# Patient Record
Sex: MALE | Race: WHITE | NOT HISPANIC OR LATINO | Employment: FULL TIME | URBAN - METROPOLITAN AREA
[De-identification: names, ages, dates, MRNs, and addresses within clinical notes are randomized per-mention and may not be internally consistent; named-entity substitution may affect disease eponyms.]

---

## 2017-01-03 ENCOUNTER — TELEPHONE (OUTPATIENT)
Dept: MEDICAL GROUP | Facility: PHYSICIAN GROUP | Age: 42
End: 2017-01-03

## 2017-01-03 DIAGNOSIS — I10 ESSENTIAL HYPERTENSION: Chronic | ICD-10-CM

## 2017-01-03 RX ORDER — LOSARTAN POTASSIUM AND HYDROCHLOROTHIAZIDE 12.5; 1 MG/1; MG/1
1 TABLET ORAL DAILY
Qty: 90 TAB | Refills: 0 | Status: SHIPPED | OUTPATIENT
Start: 2017-01-03 | End: 2017-08-02 | Stop reason: SDUPTHER

## 2017-03-01 ENCOUNTER — OFFICE VISIT (OUTPATIENT)
Dept: MEDICAL GROUP | Facility: PHYSICIAN GROUP | Age: 42
End: 2017-03-01
Payer: COMMERCIAL

## 2017-03-01 VITALS
BODY MASS INDEX: 30.63 KG/M2 | OXYGEN SATURATION: 98 % | TEMPERATURE: 98.6 F | WEIGHT: 218.8 LBS | HEART RATE: 90 BPM | RESPIRATION RATE: 16 BRPM | HEIGHT: 71 IN | SYSTOLIC BLOOD PRESSURE: 118 MMHG | DIASTOLIC BLOOD PRESSURE: 88 MMHG

## 2017-03-01 DIAGNOSIS — M75.51 BURSITIS OF RIGHT SHOULDER: ICD-10-CM

## 2017-03-01 DIAGNOSIS — F51.01 PRIMARY INSOMNIA: ICD-10-CM

## 2017-03-01 DIAGNOSIS — M25.511 CHRONIC RIGHT SHOULDER PAIN: ICD-10-CM

## 2017-03-01 DIAGNOSIS — G89.29 CHRONIC RIGHT SHOULDER PAIN: ICD-10-CM

## 2017-03-01 DIAGNOSIS — I10 ESSENTIAL HYPERTENSION: ICD-10-CM

## 2017-03-01 DIAGNOSIS — E66.9 OBESITY (BMI 30-39.9): ICD-10-CM

## 2017-03-01 PROCEDURE — 99214 OFFICE O/P EST MOD 30 MIN: CPT | Performed by: NURSE PRACTITIONER

## 2017-03-01 RX ORDER — ZOLPIDEM TARTRATE 5 MG/1
5 TABLET ORAL NIGHTLY PRN
Qty: 30 TAB | Refills: 0 | Status: SHIPPED | OUTPATIENT
Start: 2017-03-01 | End: 2017-06-07

## 2017-03-01 ASSESSMENT — PATIENT HEALTH QUESTIONNAIRE - PHQ9: CLINICAL INTERPRETATION OF PHQ2 SCORE: 0

## 2017-03-01 NOTE — PROGRESS NOTES
Subjective:     Chief Complaint   Patient presents with   • Medication Management     Losartan    • Sleep Problem       HPI:  Leo Manuel is a 41 y.o. male here today to discuss the following:    Right shoulder pain  Patient had surgery on the right shoulder November 2016. Problem is resolved and patient is in recovery.    Hypertension  Chronic condition: Patient has been treated for hypertension for several years. He is currently taking losartan-hydrochlorothiazide without any side effects and blood pressure is well controlled. He denies any chest pain, diaphoresis, shortness of breath, headaches, dizziness or blurred vision.     Primary insomnia  Reports difficulty staying asleep. He is able to fall asleep without assistance. Goes to bed  and wakes up 2 hours later without alarm. He admits to feeling tired and nonrefreshed in morning daytime fatigue.             Current medicines (including changes today)  Current Outpatient Prescriptions   Medication Sig Dispense Refill   • zolpidem (AMBIEN) 5 MG Tab Take 1 Tab by mouth at bedtime as needed for Sleep. 30 Tab 0   • losartan-hydrochlorothiazide (HYZAAR) 100-12.5 MG per tablet Take 1 Tab by mouth every day. 90 Tab 0   • Calcium Carbonate Antacid (TUMS PO) Take  by mouth as needed.     • ibuprofen (MOTRIN) 200 MG Tab Take 200 mg by mouth every 6 hours as needed.     • naproxen (ALEVE) 220 MG tablet Take 220 mg by mouth 2 times a day, with meals.     • tizanidine (ZANAFLEX) 4 MG Tab Take 4 mg by mouth at bedtime as needed.     • oxycodone immediate release (ROXICODONE) 10 MG immediate release tablet Take 10 mg by mouth every four hours as needed for Moderate Pain.       No current facility-administered medications for this visit.       He  has a past medical history of Hypertension; Heart burn; Pain; and Elevated cholesterol.    ROS   Review of Systems   Constitutional: Negative for fever, chills, weight loss and malaise/fatigue.   HENT: Negative for ear pain,  "nosebleeds, congestion, sore throat and neck pain.    Respiratory: Negative for cough, sputum production, shortness of breath and wheezing.    Cardiovascular: Negative for chest pain, palpitations,  and leg swelling.   Gastrointestinal: Negative for heartburn, nausea, vomiting, diarrhea and abdominal pain.   Neurological: Negative for dizziness, tingling, tremors, sensory change, focal weakness and headaches.   Psychiatric/Behavioral: Negative for depression, anxiety, suicidal ideas,  and memory loss.  Positive for insomnia  All other systems reviewed and are negative except as in HPI.     Objective:   Physical Exam:  Blood pressure 118/88, pulse 90, temperature 37 °C (98.6 °F), resp. rate 16, height 1.803 m (5' 10.98\"), weight 99.247 kg (218 lb 12.8 oz), SpO2 98 %. Body mass index is 30.53 kg/(m^2).   General:  Well nourished, well developed in NAD  Head is grossly normal.  Neck: Supple without JVD   Pulmonary:  Normal effort.  Cardiovascular: Regular rate   Extremities: no clubbing, cyanosis, or edema.   Assessment and Plan:   The following treatment plan was discussed   1. Primary insomnia  zolpidem (AMBIEN) 5 MG Tab   2. Chronic right shoulder pain     3. Bursitis of right shoulder     4. Essential hypertension     5. Obesity (BMI 30-39.9)  Patient identified as having weight management issue.  Appropriate orders and counseling given.   Discussed sleep hygiene. Go to bed and wake up at consistent times whether work/school day or not. Keep room dark, quiet, and comfortable. Don't nap. Increase exposure to sunlight during awake times and avoid bright lights before going to sleep. Avoid stimulant or caffeine use more than 4 hours after wake time.     Followup: Return in about 1 year (around 3/1/2018), or if symptoms worsen or fail to improve, for HTN/Lipid, insomnia.   Please note that this dictation was created using voice recognition software. I have made every reasonable attempt to correct obvious errors, but I " expect that there are errors of grammar and possibly content that I did not discover before finalizing the note.

## 2017-03-01 NOTE — PATIENT INSTRUCTIONS
Zolpidem tablets  What is this medicine?  ZOLPIDEM (zole PI dem) is used to treat insomnia. This medicine helps you to fall asleep and sleep through the night.  This medicine may be used for other purposes; ask your health care provider or pharmacist if you have questions.  COMMON BRAND NAME(S): Eliel  What should I tell my health care provider before I take this medicine?  They need to know if you have any of these conditions:  -depression  -history of a drug or alcohol abuse problem  -liver disease  -lung or breathing disease  -suicidal thoughts  -an unusual or allergic reaction to zolpidem, other medicines, foods, dyes, or preservatives  -pregnant or trying to get pregnant  -breast-feeding  How should I use this medicine?  Take this medicine by mouth with a glass of water. Follow the directions on the prescription label. It is better to take this medicine on an empty stomach and only when you are ready for bed. Do not take your medicine more often than directed. If you have been taking this medicine for several weeks and suddenly stop taking it, you may get unpleasant withdrawal symptoms. Your doctor or health care professional may want to gradually reduce the dose. Do not stop taking this medicine on your own. Always follow your doctor or health care professional's advice.  A special MedGuide will be given to you by the pharmacist with each prescription and refill. Be sure to read this information carefully each time.  Talk to your pediatrician regarding the use of this medicine in children. Special care may be needed.  Overdosage: If you think you have taken too much of this medicine contact a poison control center or emergency room at once.  NOTE: This medicine is only for you. Do not share this medicine with others.  What if I miss a dose?  This does not apply. This medicine should only be taken immediately before going to sleep. Do not take double or extra doses.  What may interact with this  medicine?  -herbal medicines like kava kava, melatonin, Lee's wort and valerian  -medicines for fungal infections like ketoconazole, fluconazole, or itraconazole  -medicines for treating depression or other mental problems  -other medicines given for sleep  -some medicines for Parkinson' s disease or other movement disorders  -some medicines used to treat HIV infection or AIDS, like ritonavir  This list may not describe all possible interactions. Give your health care provider a list of all the medicines, herbs, non-prescription drugs, or dietary supplements you use. Also tell them if you smoke, drink alcohol, or use illegal drugs. Some items may interact with your medicine.  What should I watch for while using this medicine?  Visit your doctor or health care professional for regular checks on your progress. Keep a regular sleep schedule by going to bed at about the same time each night. Avoid caffeine-containing drinks in the evening hours. When sleep medicines are used every night for more than a few weeks, they may stop working. Talk to your doctor if you still have trouble sleeping.  Do not take this medicine unless you are able to get a full night's sleep before you must be active again. You may not be able to remember things that you do in the hours after you take this medicine. Some people have reported driving, making phone calls, or preparing and eating food while asleep after taking sleep medicine. Take this medicine right before going to sleep. Tell your doctor if you are have any problems with your memory.  After you stop taking this medicine, you may have trouble falling asleep. This is called rebound insomnia. This problem usually goes away on its own after 1 or 2 nights.  You may get drowsy or dizzy. Do not drive, use machinery, or do anything that needs mental alertness until you know how this medicine affects you. Do not stand or sit up quickly, especially if you are an older patient. This  reduces the risk of dizzy or fainting spells. Alcohol may interfere with the effect of this medicine. Avoid alcoholic drinks. This medicine may cause a decrease in mental alertness the morning after use, even if you feel that you are fully awake. Tell your doctor if you will need to perform activities requiring full alertness, such as driving, the next morning after you have taken this medicine.  If you or your family notice any changes in your behavior, or if you have any unusual or disturbing thoughts, call your doctor right away.  What side effects may I notice from receiving this medicine?  Side effects that you should report to your doctor or health care professional as soon as possible:  -allergic reactions like skin rash, itching or hives, swelling of the face, lips, or tongue  -changes in vision  -confusion  -depressed mood  -feeling faint or lightheaded, falls  -hallucinations  -problems with balance, speaking, walking  -restlessness, excitability, or feelings of agitation  -unusual activities while asleep like driving, eating, making phone calls  Side effects that usually do not require medical attention (report to your doctor or health care professional if they continue or are bothersome):  -diarrhea  -dizziness, or daytime drowsiness, sometimes called a hangover effect  -headache  This list may not describe all possible side effects. Call your doctor for medical advice about side effects. You may report side effects to FDA at 7-518-FDA-3390.  Where should I keep my medicine?  Keep out of the reach of children. This medicine can be abused. Keep your medicine in a safe place to protect it from theft. Do not share this medicine with anyone. Selling or giving away this medicine is dangerous and against the law.  Store at room temperature between 20 and 25 degrees C (68 and 77 degrees F). Throw away any unused medicine after the expiration date.  NOTE: This sheet is a summary. It may not cover all possible  information. If you have questions about this medicine, talk to your doctor, pharmacist, or health care provider.  © 2014, [employer]. (12/3/2013 4:54:48 PM)

## 2017-03-01 NOTE — MR AVS SNAPSHOT
"        Leo Manuel   3/1/2017 8:20 AM   Office Visit   MRN: [id number]    Department:  [facility]   Dept Phone:  [phone]    Description:  Male : 1975   Provider:  YARITZA Liriano           Reason for Visit     Medication Management Losartan     Sleep Problem           Allergies as of 3/1/2017     Allergen Noted Reactions    Shellfish Allergy 2016   Anaphylaxis      You were diagnosed with     Primary insomnia   [677563]       Chronic right shoulder pain   [749785]       Bursitis of right shoulder   [555338]       Essential hypertension   [5724106]       Obesity (BMI 30-39.9)   [688614]         Vital Signs     Blood Pressure Pulse Temperature Respirations Height Weight    118/88 mmHg 90 37 °C (98.6 °F) 16 1.803 m (5' 10.98\") 99.247 kg (218 lb 12.8 oz)    Body Mass Index Oxygen Saturation Smoking Status             30.53 kg/m2 98% Current Every Day Smoker         Basic Information     Date Of Birth Sex Race Ethnicity Preferred Language    1975 Male White Non- English      Problem List              ICD-10-CM Priority Class Noted - Resolved    Hypertension I10   2016 - Present    Bursitis of right shoulder M75.51   2016 - Present    Right shoulder pain M25.511   2016 - Present    Primary insomnia F51.01   3/1/2017 - Present    Obesity (BMI 30-39.9) E66.9   3/1/2017 - Present      Health Maintenance        Date Due Completion Dates    IMM DTaP/Tdap/Td Vaccine (1 - Tdap) 1994 ---    IMM INFLUENZA (1) 2016 ---            Current Immunizations     No immunizations on file.      Below and/or attached are the medications your provider expects you to take. Review all of your home medications and newly ordered medications with your provider and/or pharmacist. Follow medication instructions as directed by your provider and/or pharmacist. Please keep your medication list with you and share with your provider. Update the information when " medications are discontinued, doses are changed, or new medications (including over-the-counter products) are added; and carry medication information at all times in the event of emergency situations     Allergies:  SHELLFISH ALLERGY - Anaphylaxis               Medications  Valid as of: March 01, 2017 - 12:37 PM    Generic Name Brand Name Tablet Size Instructions for use    Calcium Carbonate Antacid   Take  by mouth as needed.        Ibuprofen (Tab) MOTRIN 200 MG Take 200 mg by mouth every 6 hours as needed.        Losartan Potassium-HCTZ (Tab) HYZAAR 100-12.5 MG Take 1 Tab by mouth every day.        Naproxen Sodium (Tab) ANAPROX 220 MG Take 220 mg by mouth 2 times a day, with meals.        OxyCODONE HCl (Tab) ROXICODONE 10 MG Take 10 mg by mouth every four hours as needed for Moderate Pain.        TiZANidine HCl (Tab) ZANAFLEX 4 MG Take 4 mg by mouth at bedtime as needed.        Zolpidem Tartrate (Tab) AMBIEN 5 MG Take 1 Tab by mouth at bedtime as needed for Sleep.        .                 Medicines prescribed today were sent to:     [facility] [address] [place], [place] - [facility] [address] [address]    Phone: [phone] Fax: [phone]    Open 24 Hours?: No      Medication refill instructions:       If your prescription bottle indicates you have medication refills left, it is not necessary to call your provider’s office. Please contact your pharmacy and they will refill your medication.    If your prescription bottle indicates you do not have any refills left, you may request refills at any time through one of the following ways: The online [employer] system (except Urgent Care), by calling your provider’s office, or by asking your pharmacy to contact your provider’s office with a refill request. Medication refills are processed only during regular business hours and may not be available until the next business day. Your provider may request additional information or to have a  follow-up visit with you prior to refilling your medication.   *Please Note: Medication refills are assigned a new Rx number when refilled electronically. Your pharmacy may indicate that no refills were authorized even though a new prescription for the same medication is available at the pharmacy. Please request the medicine by name with the pharmacy before contacting your provider for a refill.        Instructions    Zolpidem tablets  What is this medicine?  ZOLPIDEM (zole PI dem) is used to treat insomnia. This medicine helps you to fall asleep and sleep through the night.  This medicine may be used for other purposes; ask your health care provider or pharmacist if you have questions.  COMMON BRAND NAME(S): Eliel  What should I tell my health care provider before I take this medicine?  They need to know if you have any of these conditions:  -depression  -history of a drug or alcohol abuse problem  -liver disease  -lung or breathing disease  -suicidal thoughts  -an unusual or allergic reaction to zolpidem, other medicines, foods, dyes, or preservatives  -pregnant or trying to get pregnant  -breast-feeding  How should I use this medicine?  Take this medicine by mouth with a glass of water. Follow the directions on the prescription label. It is better to take this medicine on an empty stomach and only when you are ready for bed. Do not take your medicine more often than directed. If you have been taking this medicine for several weeks and suddenly stop taking it, you may get unpleasant withdrawal symptoms. Your doctor or health care professional may want to gradually reduce the dose. Do not stop taking this medicine on your own. Always follow your doctor or health care professional's advice.  A special MedGuide will be given to you by the pharmacist with each prescription and refill. Be sure to read this information carefully each time.  Talk to your pediatrician regarding the use of this medicine in children.  Special care may be needed.  Overdosage: If you think you have taken too much of this medicine contact a poison control center or emergency room at once.  NOTE: This medicine is only for you. Do not share this medicine with others.  What if I miss a dose?  This does not apply. This medicine should only be taken immediately before going to sleep. Do not take double or extra doses.  What may interact with this medicine?  -herbal medicines like kava kava, melatonin, Gaastra's wort and valerian  -medicines for fungal infections like ketoconazole, fluconazole, or itraconazole  -medicines for treating depression or other mental problems  -other medicines given for sleep  -some medicines for Parkinson' s disease or other movement disorders  -some medicines used to treat HIV infection or AIDS, like ritonavir  This list may not describe all possible interactions. Give your health care provider a list of all the medicines, herbs, non-prescription drugs, or dietary supplements you use. Also tell them if you smoke, drink alcohol, or use illegal drugs. Some items may interact with your medicine.  What should I watch for while using this medicine?  Visit your doctor or health care professional for regular checks on your progress. Keep a regular sleep schedule by going to bed at about the same time each night. Avoid caffeine-containing drinks in the evening hours. When sleep medicines are used every night for more than a few weeks, they may stop working. Talk to your doctor if you still have trouble sleeping.  Do not take this medicine unless you are able to get a full night's sleep before you must be active again. You may not be able to remember things that you do in the hours after you take this medicine. Some people have reported driving, making phone calls, or preparing and eating food while asleep after taking sleep medicine. Take this medicine right before going to sleep. Tell your doctor if you are have any problems with  your memory.  After you stop taking this medicine, you may have trouble falling asleep. This is called rebound insomnia. This problem usually goes away on its own after 1 or 2 nights.  You may get drowsy or dizzy. Do not drive, use machinery, or do anything that needs mental alertness until you know how this medicine affects you. Do not stand or sit up quickly, especially if you are an older patient. This reduces the risk of dizzy or fainting spells. Alcohol may interfere with the effect of this medicine. Avoid alcoholic drinks. This medicine may cause a decrease in mental alertness the morning after use, even if you feel that you are fully awake. Tell your doctor if you will need to perform activities requiring full alertness, such as driving, the next morning after you have taken this medicine.  If you or your family notice any changes in your behavior, or if you have any unusual or disturbing thoughts, call your doctor right away.  What side effects may I notice from receiving this medicine?  Side effects that you should report to your doctor or health care professional as soon as possible:  -allergic reactions like skin rash, itching or hives, swelling of the face, lips, or tongue  -changes in vision  -confusion  -depressed mood  -feeling faint or lightheaded, falls  -hallucinations  -problems with balance, speaking, walking  -restlessness, excitability, or feelings of agitation  -unusual activities while asleep like driving, eating, making phone calls  Side effects that usually do not require medical attention (report to your doctor or health care professional if they continue or are bothersome):  -diarrhea  -dizziness, or daytime drowsiness, sometimes called a hangover effect  -headache  This list may not describe all possible side effects. Call your doctor for medical advice about side effects. You may report side effects to FDA at 0-201-FDA-3426.  Where should I keep my medicine?  Keep out of the reach of  children. This medicine can be abused. Keep your medicine in a safe place to protect it from theft. Do not share this medicine with anyone. Selling or giving away this medicine is dangerous and against the law.  Store at room temperature between 20 and 25 degrees C (68 and 77 degrees F). Throw away any unused medicine after the expiration date.  NOTE: This sheet is a summary. It may not cover all possible information. If you have questions about this medicine, talk to your doctor, pharmacist, or health care provider.  © 2014, [employer]. (12/3/2013 4:54:48 PM)            MyChart Status: Patient Declined

## 2017-06-07 ENCOUNTER — OFFICE VISIT (OUTPATIENT)
Dept: MEDICAL GROUP | Facility: PHYSICIAN GROUP | Age: 42
End: 2017-06-07
Payer: COMMERCIAL

## 2017-06-07 VITALS
RESPIRATION RATE: 16 BRPM | HEIGHT: 71 IN | WEIGHT: 198.8 LBS | DIASTOLIC BLOOD PRESSURE: 100 MMHG | SYSTOLIC BLOOD PRESSURE: 128 MMHG | OXYGEN SATURATION: 96 % | BODY MASS INDEX: 27.83 KG/M2 | TEMPERATURE: 97.7 F | HEART RATE: 93 BPM

## 2017-06-07 DIAGNOSIS — Z91.09 ENVIRONMENTAL ALLERGIES: ICD-10-CM

## 2017-06-07 DIAGNOSIS — G89.29 CHRONIC RIGHT SHOULDER PAIN: ICD-10-CM

## 2017-06-07 DIAGNOSIS — M54.40 ACUTE RIGHT-SIDED LOW BACK PAIN WITH SCIATICA, SCIATICA LATERALITY UNSPECIFIED: ICD-10-CM

## 2017-06-07 DIAGNOSIS — M25.511 CHRONIC RIGHT SHOULDER PAIN: ICD-10-CM

## 2017-06-07 DIAGNOSIS — N52.2 DRUG-INDUCED ERECTILE DYSFUNCTION: ICD-10-CM

## 2017-06-07 PROBLEM — N52.9 ED (ERECTILE DYSFUNCTION): Status: ACTIVE | Noted: 2017-06-07

## 2017-06-07 PROBLEM — M54.41 RIGHT-SIDED LOW BACK PAIN WITH SCIATICA: Status: ACTIVE | Noted: 2017-06-07

## 2017-06-07 PROCEDURE — 99214 OFFICE O/P EST MOD 30 MIN: CPT | Mod: 25 | Performed by: NURSE PRACTITIONER

## 2017-06-07 PROCEDURE — 96372 THER/PROPH/DIAG INJ SC/IM: CPT | Performed by: NURSE PRACTITIONER

## 2017-06-07 RX ORDER — TADALAFIL 10 MG/1
10 TABLET ORAL PRN
Qty: 60 TAB | Refills: 0 | Status: SHIPPED
Start: 2017-06-07 | End: 2017-10-18 | Stop reason: SDUPTHER

## 2017-06-07 RX ORDER — TRIAMCINOLONE ACETONIDE 40 MG/ML
40 INJECTION, SUSPENSION INTRA-ARTICULAR; INTRAMUSCULAR ONCE
Status: COMPLETED | OUTPATIENT
Start: 2017-06-07 | End: 2017-06-07

## 2017-06-07 RX ADMIN — TRIAMCINOLONE ACETONIDE 40 MG: 40 INJECTION, SUSPENSION INTRA-ARTICULAR; INTRAMUSCULAR at 17:03

## 2017-06-07 NOTE — MR AVS SNAPSHOT
"        Leo Manuel   2017 4:20 PM   Office Visit   MRN: [id number]    Department:  [facility]   Dept Phone:  [phone]    Description:  Male : 1975   Provider:  YARITZA Liriano           Reason for Visit     Injections Kenalog    Nevus     Medication Management           Allergies as of 2017     Allergen Noted Reactions    Shellfish Allergy 2016   Anaphylaxis      You were diagnosed with     Environmental allergies   [159529]   Kenalog 40 mg injection, continue current medication    Chronic right shoulder pain   [238301]   Follow-up with [facility]    Acute right-sided low back pain with sciatica, sciatica laterality unspecified   [1529593]   Follow-up with [facility]    Drug-induced erectile dysfunction   [[id number]]   Cialis as directed      Vital Signs     Blood Pressure Pulse Temperature Respirations Height Weight    128/100 mmHg 93 36.5 °C (97.7 °F) 16 1.803 m (5' 10.98\") 90.175 kg (198 lb 12.8 oz)    Body Mass Index Oxygen Saturation Smoking Status             27.74 kg/m2 96% Current Every Day Smoker         Basic Information     Date Of Birth Sex Race Ethnicity Preferred Language    1975 Male White Non- English      Problem List              ICD-10-CM Priority Class Noted - Resolved    Hypertension I10   2016 - Present    Bursitis of right shoulder M75.51   2016 - Present    Right shoulder pain M25.511   2016 - Present    Primary insomnia F51.01   3/1/2017 - Present    Obesity (BMI 30-39.9) E66.9   3/1/2017 - Present    Environmental allergies Z91.09   2017 - Present    Right-sided low back pain with sciatica M54.41   2017 - Present    ED (erectile dysfunction) N52.9   2017 - Present      Health Maintenance        Date Due Completion Dates    IMM DTaP/Tdap/Td Vaccine (1 - Tdap) 1994 ---            Current Immunizations     No immunizations on file.      Below and/or attached are " the medications your provider expects you to take. Review all of your home medications and newly ordered medications with your provider and/or pharmacist. Follow medication instructions as directed by your provider and/or pharmacist. Please keep your medication list with you and share with your provider. Update the information when medications are discontinued, doses are changed, or new medications (including over-the-counter products) are added; and carry medication information at all times in the event of emergency situations     Allergies:  SHELLFISH ALLERGY - Anaphylaxis               Medications  Valid as of: June 07, 2017 -  6:46 PM    Generic Name Brand Name Tablet Size Instructions for use    Calcium Carbonate Antacid   Take  by mouth as needed.        Ibuprofen (Tab) MOTRIN 200 MG Take 200 mg by mouth every 6 hours as needed.        Losartan Potassium-HCTZ (Tab) HYZAAR 100-12.5 MG Take 1 Tab by mouth every day.        Naproxen Sodium (Tab) ANAPROX 220 MG Take 220 mg by mouth 2 times a day, with meals.        OxyCODONE HCl (Tab) ROXICODONE 10 MG Take 10 mg by mouth every four hours as needed for Moderate Pain.        Tadalafil (Tab) CIALIS 10 MG Take 1 Tab by mouth as needed for Erectile Dysfunction.        TiZANidine HCl (Tab) ZANAFLEX 4 MG Take 4 mg by mouth at bedtime as needed.        .                 Medicines prescribed today were sent to:     [facility], [place] - [address] [id number] [facility] [address]    Phone: [phone] Fax: [phone]    Open 24 Hours?: No      Medication refill instructions:       If your prescription bottle indicates you have medication refills left, it is not necessary to call your provider’s office. Please contact your pharmacy and they will refill your medication.    If your prescription bottle indicates you do not have any refills left, you may request refills at any time through one of the following ways: The online GitHub system  (except Urgent Care), by calling your provider’s office, or by asking your pharmacy to contact your provider’s office with a refill request. Medication refills are processed only during regular business hours and may not be available until the next business day. Your provider may request additional information or to have a follow-up visit with you prior to refilling your medication.   *Please Note: Medication refills are assigned a new Rx number when refilled electronically. Your pharmacy may indicate that no refills were authorized even though a new prescription for the same medication is available at the pharmacy. Please request the medicine by name with the pharmacy before contacting your provider for a refill.           Pongr Access Code: [email or web address]  Expires: 7/7/2017  4:40 PM    Pongr  A secure, online tool to manage your health information     [employer]’s Pongr® is a secure, online tool that connects you to your personalized health information from the privacy of your home -- day or night - making it very easy for you to manage your healthcare. Once the activation process is completed, you can even access your medical information using the Pongr saira, which is available for free in the Apple Saira store or Google Play store.     Pongr provides the following levels of access (as shown below):   My Chart Features   [facility] Doctor [facility]  Specialists [facility]  Urgent  Care Non-[facility]  Primary Care  Doctor   Email your healthcare team securely and privately 24/7 X X X    Manage appointments: schedule your next appointment; view details of past/upcoming appointments X      Request prescription refills. X      View recent personal medical records, including lab and immunizations X X X X   View health record, including health history, allergies, medications X X X X   Read reports about your outpatient visits, procedures, consult and ER notes X X X X   See your discharge summary, which is a  recap of your hospital and/or ER visit that includes your diagnosis, lab results, and care plan. X X       How to register for TopRealty:  1. Go to  [email or web address].  2. Click on the Sign Up Now box, which takes you to the New Member Sign Up page. You will need to provide the following information:  a. Enter your TopRealty Access Code exactly as it appears at the top of this page. (You will not need to use this code after you’ve completed the sign-up process. If you do not sign up before the expiration date, you must request a new code.)   b. Enter your date of birth.   c. Enter your home email address.   d. Click Submit, and follow the next screen’s instructions.  3. Create a TopRealty ID. This will be your TopRealty login ID and cannot be changed, so think of one that is secure and easy to remember.  4. Create a TopRealty password. You can change your password at any time.  5. Enter your Password Reset Question and Answer. This can be used at a later time if you forget your password.   6. Enter your e-mail address. This allows you to receive e-mail notifications when new information is available in TopRealty.  7. Click Sign Up. You can now view your health information.    For assistance activating your TopRealty account, call [phone]        Quit Tobacco Information     Do you want to quit using tobacco?    Quitting tobacco decreases risks of cancer, heart and lung disease, increases life expectancy, improves sense of taste and smell, and increases spending money, among other benefits.    If you are thinking about quitting, we can help.  • [place] Quit Tobacco Program: 460.931.2061  o Program occurs weekly for four weeks and includes pharmacist consultation on products to support quitting smoking or chewing tobacco. A provider referral is needed for pharmacist consultation.  • Tobacco Users Help Hotline: 9-800-QUIT-NOW (918-4758) or https://[place].quitlogix.org/  o Free, confidential telephone and online  coaching for [place] residents. Sessions are designed on a schedule that is convenient for you. Eligible clients receive free nicotine replacement therapy.  • Nationally: www.smokefree.gov  o Information and professional assistance to support both immediate and long-term needs as you become, and remain, a non-smoker. Smokefree.gov allows you to choose the help that best fits your needs.

## 2017-06-07 NOTE — ASSESSMENT & PLAN NOTE
Patient is experiencing ED intermittently for the past two years. He has been taking Cialis which he gets from a [facility] pharmacy and is requesting a refill. Advised to monitor BP prior to using medication. Patient states that when he is not taking pain medication he is able to get an erection.

## 2017-06-07 NOTE — ASSESSMENT & PLAN NOTE
Patient previously had right shoulder arthroscopic surgery and continues to have pain. He has been followed up by his surgeon and received cortisone injection, and continues to have pain. He has always had full range of motion.

## 2017-06-07 NOTE — ASSESSMENT & PLAN NOTE
Patient has environmental allergies for many years. He's currently taking OTC antihistamine and Flonase nasal spray without any relief. Has nasal congestion and productive cough, but denies fever or chills.

## 2017-06-07 NOTE — ASSESSMENT & PLAN NOTE
Patient had low back pain for approximately 20 years worsening in the past 6 months. He is currently under the care of Dr. Zollinger at [facility].

## 2017-06-08 NOTE — PROGRESS NOTES
Subjective:     Chief Complaint   Patient presents with   • Injections     Kenalog   • Nevus   • Medication Management       HPI:  Leo Manuel is a 41 y.o. male here today to discuss the following:    Environmental allergies  Patient has environmental allergies for many years. He's currently taking OTC antihistamine and Flonase nasal spray without any relief. Has nasal congestion and productive cough, but denies fever or chills.    Right shoulder pain  Patient previously had right shoulder arthroscopic surgery and continues to have pain. He has been followed up by his surgeon and received cortisone injection, and continues to have pain. He has always had full range of motion.    Right-sided low back pain with sciatica  Patient had low back pain for approximately 20 years worsening in the past 6 months. He is currently under the care of Dr. Zollinger at [facility].    ED (erectile dysfunction)  Patient is experiencing ED intermittently for the past two years. He has been taking Cialis which he gets from a [place] pharmacy and is requesting a refill. Advised to monitor BP prior to using medication. Patient states that when he is not taking pain medication he is able to get an erection.         Current medicines (including changes today)  Current Outpatient Prescriptions   Medication Sig Dispense Refill   • tadalafil (CIALIS) 10 MG tablet Take 1 Tab by mouth as needed for Erectile Dysfunction. 60 Tab 0   • losartan-hydrochlorothiazide (HYZAAR) 100-12.5 MG per tablet Take 1 Tab by mouth every day. 90 Tab 0   • Calcium Carbonate Antacid (TUMS PO) Take  by mouth as needed.     • ibuprofen (MOTRIN) 200 MG Tab Take 200 mg by mouth every 6 hours as needed.     • naproxen (ALEVE) 220 MG tablet Take 220 mg by mouth 2 times a day, with meals.     • tizanidine (ZANAFLEX) 4 MG Tab Take 4 mg by mouth at bedtime as needed.     • oxycodone immediate release (ROXICODONE) 10 MG immediate release tablet Take 10 mg by  "mouth every four hours as needed for Moderate Pain.       No current facility-administered medications for this visit.       He  has a past medical history of Hypertension; Heart burn; Pain; and Elevated cholesterol.    ROS   Review of Systems   Constitutional: Negative for fever, chills, weight loss and malaise/fatigue.   HENT: Negative for ear pain, nosebleeds, congestion, sore throat and neck pain.  Positive for nasal congestion, sore throat  Respiratory: Negative for cough, sputum production, shortness of breath and wheezing. Positive for cough   Cardiovascular: Negative for chest pain, palpitations,  and leg swelling.   Gastrointestinal: Negative for heartburn, nausea, vomiting, diarrhea and abdominal pain.   : Positive for drug-induced ED  MS: Positive for right shoulder pain and lumbar back pain  Neurological: Negative for dizziness, tingling, tremors, sensory change, focal weakness and headaches. Positive for right-sided sciatica  Psychiatric/Behavioral: Negative for depression, anxiety, suicidal ideas, insomnia and memory loss.    All other systems reviewed and are negative except as in HPI.     Objective:   Physical Exam:  Blood pressure 128/100, pulse 93, temperature 36.5 °C (97.7 °F), resp. rate 16, height 1.803 m (5' 10.98\"), weight 90.175 kg (198 lb 12.8 oz), SpO2 96 %. Body mass index is 27.74 kg/(m^2).   Physical Exam:  Alert, oriented in no acute distress.  Eye contact is good, speech goal directed, affect calm  HEENT: conjunctiva non-injected, sclera non-icteric.  Pinna normal.   Neck No adenopathy or masses in the neck or supraclavicular regions.  Lungs: clear to auscultation bilaterally with good excursion.  CV: regular rate and rhythm.   Abdomen: soft, nontender, No CVAT. Normal bowel sounds.  Ext: no edema, color normal, vascularity normal, temperature normal  MS: Normal gait and station    Assessment and Plan:   The following treatment plan was discussed   1. Environmental allergies  " triamcinolone acetonide (KENALOG-40) injection 40 mg    Kenalog 40 mg injection, continue current medication   2. Chronic right shoulder pain      Follow-up with [facility]   3. Acute right-sided low back pain with sciatica, sciatica laterality unspecified      Follow-up with [facility]   4. Drug-induced erectile dysfunction  tadalafil (CIALIS) 10 MG tablet    Cialis as directed       Followup: Return in about 4 months (around 10/7/2017) for insomnia, ED, allergies.   Please note that this dictation was created using voice recognition software. I have made every reasonable attempt to correct obvious errors, but I expect that there are errors of grammar and possibly content that I did not discover before finalizing the note.

## 2017-08-01 RX ORDER — LOSARTAN POTASSIUM AND HYDROCHLOROTHIAZIDE 12.5; 1 MG/1; MG/1
TABLET ORAL
Refills: 0 | Status: CANCELLED | OUTPATIENT
Start: 2017-08-01

## 2017-08-02 DIAGNOSIS — M25.511 RIGHT SHOULDER PAIN, UNSPECIFIED CHRONICITY: ICD-10-CM

## 2017-08-02 DIAGNOSIS — I10 ESSENTIAL HYPERTENSION: Chronic | ICD-10-CM

## 2017-08-02 RX ORDER — LOSARTAN POTASSIUM AND HYDROCHLOROTHIAZIDE 12.5; 1 MG/1; MG/1
1 TABLET ORAL DAILY
Qty: 90 TAB | Refills: 1 | Status: SHIPPED | OUTPATIENT
Start: 2017-08-02 | End: 2017-08-02 | Stop reason: SDUPTHER

## 2017-08-02 RX ORDER — OXYCODONE HYDROCHLORIDE 10 MG/1
10 TABLET ORAL EVERY 4 HOURS PRN
Qty: 120 TAB | Refills: 0 | Status: SHIPPED | OUTPATIENT
Start: 2017-08-02 | End: 2017-09-05 | Stop reason: SDUPTHER

## 2017-08-03 RX ORDER — LOSARTAN POTASSIUM AND HYDROCHLOROTHIAZIDE 12.5; 1 MG/1; MG/1
TABLET ORAL
Qty: 90 TAB | Refills: 1 | Status: SHIPPED | OUTPATIENT
Start: 2017-08-03 | End: 2018-02-09 | Stop reason: SDUPTHER

## 2017-09-05 DIAGNOSIS — M25.511 RIGHT SHOULDER PAIN, UNSPECIFIED CHRONICITY: ICD-10-CM

## 2017-09-06 RX ORDER — OXYCODONE HYDROCHLORIDE 10 MG/1
10 TABLET ORAL EVERY 4 HOURS PRN
Qty: 120 TAB | Refills: 0 | Status: SHIPPED | OUTPATIENT
Start: 2017-09-06 | End: 2017-10-03 | Stop reason: SDUPTHER

## 2017-09-06 NOTE — TELEPHONE ENCOUNTER
From: Leo Manuel  Sent: 9/5/2017 4:41 PM PDT  Subject: Medication Renewal Request    Ace Manuel would like a refill of the following medications:  oxycodone immediate release (ROXICODONE) 10 MG immediate release tablet [YARITZA Liriano]    Preferred pharmacy: [facility]    Comment:

## 2017-10-03 DIAGNOSIS — M25.511 RIGHT SHOULDER PAIN, UNSPECIFIED CHRONICITY: ICD-10-CM

## 2017-10-03 RX ORDER — OXYCODONE HYDROCHLORIDE 10 MG/1
10 TABLET ORAL EVERY 4 HOURS PRN
Qty: 60 TAB | Refills: 0 | Status: SHIPPED | OUTPATIENT
Start: 2017-10-03 | End: 2017-10-18 | Stop reason: SDUPTHER

## 2017-10-18 ENCOUNTER — OFFICE VISIT (OUTPATIENT)
Dept: MEDICAL GROUP | Facility: PHYSICIAN GROUP | Age: 42
End: 2017-10-18
Payer: COMMERCIAL

## 2017-10-18 VITALS
RESPIRATION RATE: 16 BRPM | TEMPERATURE: 98.8 F | SYSTOLIC BLOOD PRESSURE: 102 MMHG | WEIGHT: 207 LBS | HEART RATE: 85 BPM | DIASTOLIC BLOOD PRESSURE: 80 MMHG | HEIGHT: 71 IN | BODY MASS INDEX: 28.98 KG/M2 | OXYGEN SATURATION: 97 %

## 2017-10-18 DIAGNOSIS — G89.29 OTHER CHRONIC PAIN: ICD-10-CM

## 2017-10-18 DIAGNOSIS — M25.511 RIGHT SHOULDER PAIN, UNSPECIFIED CHRONICITY: Chronic | ICD-10-CM

## 2017-10-18 DIAGNOSIS — M25.561 ACUTE PAIN OF RIGHT KNEE: ICD-10-CM

## 2017-10-18 DIAGNOSIS — F17.219 CIGARETTE NICOTINE DEPENDENCE WITH NICOTINE-INDUCED DISORDER: ICD-10-CM

## 2017-10-18 DIAGNOSIS — I10 ESSENTIAL HYPERTENSION: ICD-10-CM

## 2017-10-18 DIAGNOSIS — M75.51 BURSITIS OF RIGHT SHOULDER: ICD-10-CM

## 2017-10-18 DIAGNOSIS — N52.2 DRUG-INDUCED ERECTILE DYSFUNCTION: ICD-10-CM

## 2017-10-18 PROBLEM — F17.200 NICOTINE ADDICTION: Status: ACTIVE | Noted: 2017-10-18

## 2017-10-18 PROCEDURE — 99214 OFFICE O/P EST MOD 30 MIN: CPT | Performed by: NURSE PRACTITIONER

## 2017-10-18 RX ORDER — OXYCODONE HYDROCHLORIDE 10 MG/1
10 TABLET ORAL EVERY 4 HOURS PRN
Qty: 120 TAB | Refills: 0 | Status: SHIPPED | OUTPATIENT
Start: 2017-10-18 | End: 2017-11-14 | Stop reason: SDUPTHER

## 2017-10-18 RX ORDER — NICOTINE 21 MG/24HR
1 PATCH, TRANSDERMAL 24 HOURS TRANSDERMAL EVERY 24 HOURS
Qty: 30 PATCH | Refills: 2 | Status: SHIPPED | OUTPATIENT
Start: 2017-10-18 | End: 2018-01-17

## 2017-10-18 RX ORDER — TADALAFIL 10 MG/1
10 TABLET ORAL PRN
Qty: 60 TAB | Refills: 5 | Status: SHIPPED
Start: 2017-10-18 | End: 2018-08-06 | Stop reason: SDUPTHER

## 2017-10-18 ASSESSMENT — ENCOUNTER SYMPTOMS: DEPRESSION: 0

## 2017-10-18 NOTE — ASSESSMENT & PLAN NOTE
Patient has been taking Roxicodone since having shoulder surgery by Dr. Morelos. He has full motion of the right shoulder that has burning  Sensation radiating down his arm. He has been treated by [facility] pain management with injections. Has difficulty making appointments with physicians due to job scheduling. He was encouraged to follow-up with Dr. Morelos. Last dose of Roxicodone was 6 days ago.

## 2017-10-18 NOTE — ASSESSMENT & PLAN NOTE
Chronic condition: Patient is treated for hypertension with losartan-HCTZ 100-12 0.5 mg daily and blood pressures well controlled.  He denies any chest pain, diaphoresis, shortness of breath, headaches, dizziness or blurred vision.

## 2017-10-18 NOTE — PROGRESS NOTES
Subjective:     Chief Complaint   Patient presents with   • Medication Management   • Leg Pain     Rt x 6 days        HPI:  Leo Manuel is a 42 y.o. male here today to discuss the following:    Hypertension  Chronic condition: Patient is treated for hypertension with losartan-HCTZ 100-12 0.5 mg daily and blood pressures well controlled.  He denies any chest pain, diaphoresis, shortness of breath, headaches, dizziness or blurred vision.     Right shoulder pain  Patient has been taking Roxicodone since having shoulder surgery by Dr. Solares. He has full motion of the right shoulder that has burning  Sensation radiating down his arm. He has been treated by [facility] with injections. Has difficulty making appointments with physicians due to job scheduling. He was encouraged to follow-up with Dr. Solares. Last dose of Roxicodone was 6 days ago.      Acute pain of right knee  Patient reports right knee pain and swelling for more than a week. 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 He denies any specific injury or trauma.  He has full range of motion. He is currently on narcotics for shoulder pain and has been taking them for his knee pain also he ran out of  Roxicodone but states he had some hydrocodone at home that is an old prescription.     Current medicines (including changes today)  Current Outpatient Prescriptions   Medication Sig Dispense Refill   • oxycodone immediate release (ROXICODONE) 10 MG immediate release tablet Take 1 Tab by mouth every four hours as needed for Moderate Pain. 120 Tab 0   • nicotine (NICODERM) 14 MG/24HR PATCH 24 HR Apply 1 Patch to skin as directed every 24 hours. 30 Patch 2   • tadalafil (CIALIS) 10 MG tablet Take 1 Tab by mouth as needed for Erectile Dysfunction. 60 Tab 5   • losartan-hydrochlorothiazide (HYZAAR) 100-12.5 MG per tablet TAKE 1 TABLET BY MOUTH EVERY DAY 90 Tab 1   • Calcium Carbonate Antacid (TUMS PO) Take  by mouth as needed.    "  • ibuprofen (MOTRIN) 200 MG Tab Take 200 mg by mouth every 6 hours as needed.     • naproxen (ALEVE) 220 MG tablet Take 220 mg by mouth 2 times a day, with meals.     • tizanidine (ZANAFLEX) 4 MG Tab Take 4 mg by mouth at bedtime as needed.       No current facility-administered medications for this visit.        He  has a past medical history of Elevated cholesterol; Heart burn; Hypertension; and Pain.    ROS   Review of Systems   Constitutional: Negative for fever, chills, weight loss and malaise/fatigue.   HENT: Negative for ear pain, nosebleeds, congestion, sore throat and neck pain.    Respiratory: Negative for cough, sputum production, shortness of breath and wheezing.    Cardiovascular: Negative for chest pain, palpitations,  and leg swelling.   Gastrointestinal: Negative for heartburn, nausea, vomiting, diarrhea and abdominal pain.   Neurological: Negative for dizziness, tingling, tremors, sensory change, focal weakness and headaches.   Psychiatric/Behavioral: Negative for depression, anxiety, suicidal ideas, insomnia and memory loss.    All other systems reviewed and are negative except as in HPI.     Objective:   Physical Exam:  Blood pressure 102/80, pulse 85, temperature 37.1 °C (98.8 °F), resp. rate 16, height 1.803 m (5' 10.98\"), weight 93.9 kg (207 lb), SpO2 97 %. Body mass index is 28.89 kg/m².   Physical Exam:  Alert, oriented in no acute distress.  Eye contact is good, speech goal directed, affect calm  HEENT: conjunctiva non-injected, sclera non-icteric.  Pinna normal.   Neck No adenopathy or masses in the neck or supraclavicular regions.  Lungs: clear to auscultation bilaterally with good excursion.  CV: regular rate and rhythm.   Abdomen: soft, nontender, No CVAT. Normal bowel sounds.  Ext: right knee edema  MS: Normal gait and station    Assessment and Plan:   The following treatment plan was discussed   1. Right shoulder pain, unspecified chronicity  [facility] PAIN MANAGEMENT SCREEN    " Controlled Substance Treatment Agreement    oxycodone immediate release (ROXICODONE) 10 MG immediate release tablet    Follow-up with Dr. Solares   2. Cigarette nicotine dependence with nicotine-induced disorder  nicotine (NICODERM) 14 MG/24HR PATCH 24 HR    Nicotine patches as directed   3. Drug-induced erectile dysfunction  tadalafil (CIALIS) 10 MG tablet    Cialis as directed   4. Essential hypertension      Continue current medication   5. Bursitis of right shoulder      Follow-up with orthopedics   6. Acute pain of right knee      Ice, elevation, follow up when necessary. Patient will call for x-ray order If no improvement   7. Other chronic pain      UDS ordered today       Followup: Return in about 3 months (around 1/18/2018) for pain meds.   Please note that this dictation was created using voice recognition software. I have made every reasonable attempt to correct obvious errors, but I expect that there are errors of grammar and possibly content that I did not discover before finalizing the note.

## 2017-10-18 NOTE — ASSESSMENT & PLAN NOTE
Patient reports right knee pain and swelling for more than a week. 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 He denies any specific injury or trauma.  He has full range of motion. He is currently on narcotics for shoulder pain and has been taking them for his knee pain also he ran out of  Roxicodone but states he had some hydrocodone at home that is an old prescription.

## 2017-10-18 NOTE — ASSESSMENT & PLAN NOTE
Patient has been taking Roxicodone since having shoulder surgery by Dr. Morelos. He has full motion of the right shoulder that has burning  Sensation radiating down his arm. He has been treated by [place] pain management with injections. Has difficulty making appointments with physicians due to job scheduling. He was encouraged to follow-up with Dr. Morelos. Last dose of Roxicodone was 6 days ago.

## 2017-10-26 DIAGNOSIS — F17.219 CIGARETTE NICOTINE DEPENDENCE WITH NICOTINE-INDUCED DISORDER: ICD-10-CM

## 2017-11-14 DIAGNOSIS — M25.511 RIGHT SHOULDER PAIN, UNSPECIFIED CHRONICITY: Chronic | ICD-10-CM

## 2017-11-14 RX ORDER — OXYCODONE HYDROCHLORIDE 10 MG/1
10 TABLET ORAL EVERY 4 HOURS PRN
Qty: 120 TAB | Refills: 0 | Status: SHIPPED | OUTPATIENT
Start: 2017-11-14 | End: 2017-12-12 | Stop reason: SDUPTHER

## 2017-11-14 NOTE — TELEPHONE ENCOUNTER
Was the patient seen in the last year in this department? Yes     Does patient have an active prescription for medications requested? No     Received Request Via: Patient jerman

## 2017-11-14 NOTE — TELEPHONE ENCOUNTER
From: Leo Manuel  Sent: 11/14/2017 8:28 AM PST  Subject: Medication Renewal Request    Ace Manuel would like a refill of the following medications:  oxycodone immediate release (ROXICODONE) 10 MG immediate release tablet [YARITZA Liriano]    Preferred pharmacy: [facility] [address] [place] [address], [place] - [address] AT [facility] [address]    Comment:

## 2017-12-12 DIAGNOSIS — M25.511 RIGHT SHOULDER PAIN, UNSPECIFIED CHRONICITY: Chronic | ICD-10-CM

## 2017-12-12 RX ORDER — OXYCODONE HYDROCHLORIDE 10 MG/1
10 TABLET ORAL EVERY 4 HOURS PRN
Qty: 120 TAB | Refills: 0 | Status: SHIPPED | OUTPATIENT
Start: 2017-12-12 | End: 2018-01-16 | Stop reason: SDUPTHER

## 2017-12-12 NOTE — TELEPHONE ENCOUNTER
From: Leo Manuel  Sent: 12/12/2017 8:47 AM PST  Subject: Medication Renewal Request    Ace Manuel would like a refill of the following medications:     oxycodone immediate release (ROXICODONE) 10 MG immediate release tablet [YARITZA Liriano]    Preferred pharmacy: [facility] [address] [place] [address], [place] - [address] AT [facility] [address]    

## 2018-01-12 ENCOUNTER — TELEPHONE (OUTPATIENT)
Dept: MEDICAL GROUP | Facility: PHYSICIAN GROUP | Age: 43
End: 2018-01-12

## 2018-01-16 ENCOUNTER — OFFICE VISIT (OUTPATIENT)
Dept: MEDICAL GROUP | Facility: PHYSICIAN GROUP | Age: 43
End: 2018-01-16
Payer: COMMERCIAL

## 2018-01-16 VITALS
HEART RATE: 78 BPM | HEIGHT: 71 IN | WEIGHT: 200 LBS | RESPIRATION RATE: 16 BRPM | TEMPERATURE: 99 F | SYSTOLIC BLOOD PRESSURE: 150 MMHG | BODY MASS INDEX: 28 KG/M2 | OXYGEN SATURATION: 94 % | DIASTOLIC BLOOD PRESSURE: 90 MMHG

## 2018-01-16 DIAGNOSIS — M25.511 RIGHT SHOULDER PAIN, UNSPECIFIED CHRONICITY: Chronic | ICD-10-CM

## 2018-01-16 DIAGNOSIS — M25.511 RIGHT SHOULDER PAIN, UNSPECIFIED CHRONICITY: ICD-10-CM

## 2018-01-16 PROCEDURE — 99214 OFFICE O/P EST MOD 30 MIN: CPT | Performed by: NURSE PRACTITIONER

## 2018-01-16 RX ORDER — OXYCODONE HYDROCHLORIDE 10 MG/1
10 TABLET ORAL EVERY 6 HOURS PRN
Qty: 120 TAB | Refills: 0 | Status: SHIPPED | OUTPATIENT
Start: 2018-01-16 | End: 2018-09-25 | Stop reason: SDUPTHER

## 2018-01-17 NOTE — PROGRESS NOTES
Chief Complaint   Patient presents with   • Medication Refill       HISTORY OF PRESENT ILLNESS: Patient is a 42 y.o. male established patient who presents today to discuss the following issues:    Right shoulder pain  Pain has been much worse.  Has an appointment with his surgeon tomorrow to discuss his options.  Has not been pain free since his first surgery.  Has been taking oxycodone 10 mg 4 times a day.  He was told that due to new laws, he will be given a 30-day-supply and a referral pain management.  He would like to discuss his shoulder with the surgeon before we proceed with the referral to pain management.  He understands that if his surgeon will not take over his pain management, a referral will be placed.        Patient Active Problem List    Diagnosis Date Noted   • Nicotine addiction 10/18/2017   • Acute pain of right knee 10/18/2017   • Other chronic pain 10/18/2017   • Environmental allergies 06/07/2017   • Right-sided low back pain with sciatica 06/07/2017   • ED (erectile dysfunction) 06/07/2017   • Primary insomnia 03/01/2017   • Obesity (BMI 30-39.9) 03/01/2017   • Right shoulder pain 11/29/2016   • Hypertension 08/19/2016   • Bursitis of right shoulder 08/19/2016       Allergies:Shellfish allergy    Current Outpatient Prescriptions   Medication Sig Dispense Refill   • oxycodone immediate release (ROXICODONE) 10 MG immediate release tablet Take 1 Tab by mouth every 6 hours as needed for Moderate Pain for up to 30 days. 120 Tab 0   • tadalafil (CIALIS) 10 MG tablet Take 1 Tab by mouth as needed for Erectile Dysfunction. 60 Tab 5   • losartan-hydrochlorothiazide (HYZAAR) 100-12.5 MG per tablet TAKE 1 TABLET BY MOUTH EVERY DAY 90 Tab 1   • tizanidine (ZANAFLEX) 4 MG Tab Take 4 mg by mouth at bedtime as needed.     • Calcium Carbonate Antacid (TUMS PO) Take  by mouth as needed.       No current facility-administered medications for this visit.        Social History   Substance Use Topics   • Smoking  "status: Current Every Day Smoker     Packs/day: 0.50     Years: 6.00     Types: Cigarettes   • Smokeless tobacco: Never Used   • Alcohol use 0.0 oz/week      Comment: 4 per month       Family Status   Relation Status   • Mother Alive   • Father Alive   History reviewed. No pertinent family history.    Review of Systems:   Constitutional: Negative for fever, chills, weight loss and malaise/fatigue.   HENT: Negative for ear pain, nosebleeds, congestion, sore throat and neck pain.    Eyes: Negative for blurred vision.   Respiratory: Negative for cough, sputum production, shortness of breath and wheezing.    Cardiovascular: Negative for chest pain, palpitations, orthopnea and leg swelling.   Gastrointestinal: Negative for heartburn, nausea, vomiting and abdominal pain.   Genitourinary: Negative for dysuria, urgency and frequency.   Musculoskeletal: Negative for myalgias, joint pain, and back pain.  Positive for chronic right shoulder pain.  Skin: Negative for rash and itching.   Neurological: Negative for dizziness, tingling, tremors, sensory change, focal weakness and headaches.   Endo/Heme/Allergies: Does not bruise/bleed easily.   Psychiatric/Behavioral: Negative for depression, suicidal ideas and memory loss.  The patient is not nervous/anxious and does not have insomnia.    All other systems reviewed and are negative except as in HPI.    Exam:  Blood pressure 150/90, pulse 78, temperature 37.2 °C (99 °F), resp. rate 16, height 1.803 m (5' 11\"), weight 90.7 kg (200 lb), SpO2 94 %.  General:  Well nourished, well developed male in NAD  Head: Grossly normal.  Neck: Supple without JVD or bruit. Thyroid is not enlarged.  Pulmonary: Clear to ausculation. Normal effort. No rales, ronchi, or wheezing.  Cardiovascular: Regular rate and rhythm without murmur.   Abdomen:  Soft, nontender, nondistended.  Extremities: No clubbing, cyanosis, or edema.  Skin: Intact with no obvious rashes or lesions.  Neuro: Grossly " intact.  Psych: Alert and oriented x 3.  Mood and affect appropriate.    Medical decision-making and discussion: Leo is here today to discuss his right shoulder pain.  He was given 1 prescription for his pain medication, amd he will contact me tomorrow to let me know if he needs a referral to pain management.  He will follow-up here as needed.          Assessment/Plan:  1. Right shoulder pain, unspecified chronicity  oxycodone immediate release (ROXICODONE) 10 MG immediate release tablet    CONSENT FOR OPIATE PRESCRIPTION   2. Right shoulder pain, unspecified chronicity  oxycodone immediate release (ROXICODONE) 10 MG immediate release tablet    CONSENT FOR OPIATE PRESCRIPTION    Follow-up with Dr. Solares       Return if symptoms worsen or fail to improve.    Please note that this dictation was created using voice recognition software. I have made every reasonable attempt to correct obvious errors, but I expect that there are errors of grammar and possibly content that I did not discover before finalizing the note.

## 2018-01-17 NOTE — ASSESSMENT & PLAN NOTE
Pain has been much worse.  Has an appointment with his surgeon tomorrow to discuss his options.  Has not been pain free since his first surgery.  Has been taking oxycodone 10 mg 4 times a day.  He was told that due to new laws, he will be given a 30-day-supply and a referral pain management.  He would like to discuss his shoulder with the surgeon before we proceed with the referral to pain management.  He understands that if his surgeon will not take over his pain management, a referral will be placed.

## 2018-01-22 ENCOUNTER — TELEPHONE (OUTPATIENT)
Dept: MEDICAL GROUP | Facility: PHYSICIAN GROUP | Age: 43
End: 2018-01-22

## 2018-01-22 DIAGNOSIS — M25.511 CHRONIC RIGHT SHOULDER PAIN: ICD-10-CM

## 2018-01-22 DIAGNOSIS — G89.29 CHRONIC RIGHT SHOULDER PAIN: ICD-10-CM

## 2018-01-23 NOTE — TELEPHONE ENCOUNTER
WILFRED Rogel 3 minutes ago (5:17 PM)         I spoke with my surgeon and she will not write any meds so I’m going to need a referral for pain management

## 2018-02-05 ENCOUNTER — APPOINTMENT (OUTPATIENT)
Dept: RADIOLOGY | Facility: MEDICAL CENTER | Age: 43
End: 2018-02-05
Attending: ORTHOPAEDIC SURGERY
Payer: COMMERCIAL

## 2018-02-05 DIAGNOSIS — M25.511 RIGHT SHOULDER PAIN, UNSPECIFIED CHRONICITY: ICD-10-CM

## 2018-02-05 PROCEDURE — 73221 MRI JOINT UPR EXTREM W/O DYE: CPT | Mod: RT

## 2018-02-09 ENCOUNTER — TELEPHONE (OUTPATIENT)
Dept: MEDICAL GROUP | Facility: PHYSICIAN GROUP | Age: 43
End: 2018-02-09

## 2018-02-09 DIAGNOSIS — I10 ESSENTIAL HYPERTENSION: ICD-10-CM

## 2018-02-09 NOTE — TELEPHONE ENCOUNTER
Was the patient seen in the last year in this department? Yes     Does patient have an active prescription for medications requested? No     Received Request Via: Pharmacy      Pt met protocol?: Yes,ov 1/16/18 bp 150/90

## 2018-02-11 RX ORDER — LOSARTAN POTASSIUM AND HYDROCHLOROTHIAZIDE 12.5; 1 MG/1; MG/1
1 TABLET ORAL
Qty: 90 TAB | Refills: 0 | Status: SHIPPED | OUTPATIENT
Start: 2018-02-11 | End: 2018-05-16 | Stop reason: SDUPTHER

## 2018-02-22 ENCOUNTER — HOSPITAL ENCOUNTER (OUTPATIENT)
Facility: MEDICAL CENTER | Age: 43
End: 2018-02-22
Attending: DERMATOLOGY
Payer: COMMERCIAL

## 2018-02-22 ENCOUNTER — OFFICE VISIT (OUTPATIENT)
Dept: DERMATOLOGY | Facility: IMAGING CENTER | Age: 43
End: 2018-02-22
Payer: COMMERCIAL

## 2018-02-22 VITALS — BODY MASS INDEX: 28 KG/M2 | WEIGHT: 200 LBS | TEMPERATURE: 98 F | HEIGHT: 71 IN

## 2018-02-22 DIAGNOSIS — D48.5 NEOPLASM OF UNCERTAIN BEHAVIOR OF SKIN: ICD-10-CM

## 2018-02-22 DIAGNOSIS — R20.9 DISTURBANCE OF SKIN SENSATION: ICD-10-CM

## 2018-02-22 PROCEDURE — 88305 TISSUE EXAM BY PATHOLOGIST: CPT

## 2018-02-22 PROCEDURE — 11311 SHAVE SKIN LESION 0.6-1.0 CM: CPT | Performed by: DERMATOLOGY

## 2018-02-22 RX ORDER — OXYCODONE AND ACETAMINOPHEN 10; 325 MG/1; MG/1
1-2 TABLET ORAL EVERY 4 HOURS PRN
COMMUNITY
End: 2018-05-10 | Stop reason: SDUPTHER

## 2018-02-22 ASSESSMENT — ENCOUNTER SYMPTOMS
FEVER: 0
CHILLS: 0

## 2018-02-22 NOTE — PROGRESS NOTES
Dermatology New Patient Visit    No chief complaint on file.      Subjective:     HPI:   Leo Manuel is a 42 y.o. male presenting for    Lesion the the right shoulder   Has been present for almost 10 years  Has grown in size  Gets irritated by clothing  When it has been traumatized a few times in the past, has bled significantly and it was difficult to stop it    History of skin cancer: No  History of biopsies:No  History of blistering/severe sunburns:Yes, Details: during youth  Family history of skin cancer:No  Family history of atypical moles:No          Past Medical History:   Diagnosis Date   • Elevated cholesterol    • Heart burn    • Hypertension    • Pain     right shoulder       Current Outpatient Prescriptions on File Prior to Visit   Medication Sig Dispense Refill   • losartan-hydrochlorothiazide (HYZAAR) 100-12.5 MG per tablet Take 1 Tab by mouth every day. 90 Tab 0   • tizanidine (ZANAFLEX) 4 MG Tab Take 4 mg by mouth at bedtime as needed.     • tadalafil (CIALIS) 10 MG tablet Take 1 Tab by mouth as needed for Erectile Dysfunction. 60 Tab 5   • Calcium Carbonate Antacid (TUMS PO) Take  by mouth as needed.       No current facility-administered medications on file prior to visit.        Allergies   Allergen Reactions   • Shellfish Allergy Anaphylaxis       No family history on file.    Social History     Social History   • Marital status: Single     Spouse name: N/A   • Number of children: N/A   • Years of education: N/A     Occupational History   • Not on file.     Social History Main Topics   • Smoking status: Current Every Day Smoker     Packs/day: 0.50     Years: 6.00     Types: Cigarettes   • Smokeless tobacco: Never Used   • Alcohol use 0.0 oz/week      Comment: 4 per month   • Drug use: No      Comment: methamphetamine x 2 years stopped 2012   • Sexual activity: Yes     Partners: Female     Other Topics Concern   • Not on file     Social History Narrative   • No narrative on file  "      Review of Systems   Constitutional: Negative for chills and fever.   Skin: Negative for itching and rash.   All other systems reviewed and are negative.       Objective:     A focused cutaneous exam was completed including: face, eyelids, conjunctiva, lips, neck, back, left and right upper extremities (including hands/digits and fingernails) with the following pertinent findings listed below. Remaining above-listed examined areas within normal limits / negative for rashes or lesions.    Temperature 36.7 °C (98 °F), height 1.803 m (5' 11\"), weight 90.7 kg (200 lb).    Physical Exam   Constitutional: He is oriented to person, place, and time and well-developed, well-nourished, and in no distress.   HENT:   Head: Normocephalic and atraumatic.   Right Ear: External ear normal.   Left Ear: External ear normal.   Nose: Nose normal.   Eyes: Conjunctivae are normal.   Neck: Normal range of motion.   Pulmonary/Chest: Effort normal.   Neurological: He is alert and oriented to person, place, and time.   Skin: Skin is warm and dry.        Psychiatric: Mood and affect normal.   Vitals reviewed.      DATA: none applicable to review    Assessment and Plan:     1. Neoplasm of uncertain behavior of skin, +Disturbance of skin sensation  Procedure Note   Procedure: Biopsy by shave technique  Location: as noted above  Size: as noted in exam  Preoperative diagnosis: cherry angioma  Risks, benefits and alternatives of procedure discussed and written informed consent obtained. Time out completed. Area of biopsy prepped with alcohol. Anesthesia with 1% lidocaine with epinephrine administered with 30 gauge needle. Shave biopsy of the site performed. Hemostasis achieved with pressure and hyfrecator. Vaseline applied to wound with bandage. Patient tolerated procedure well and there were no complications. The specimen was sent to the pathology lab by the staff. Wound care was discussed.  - PATHOLOGY SPECIMEN; Future    Followup: Return " if symptoms worsen or fail to improve.    Shira Gardner M.D.

## 2018-04-18 ENCOUNTER — TELEPHONE (OUTPATIENT)
Dept: MEDICAL GROUP | Facility: PHYSICIAN GROUP | Age: 43
End: 2018-04-18

## 2018-04-18 DIAGNOSIS — M25.511 CHRONIC RIGHT SHOULDER PAIN: ICD-10-CM

## 2018-04-18 DIAGNOSIS — G89.29 CHRONIC RIGHT SHOULDER PAIN: ICD-10-CM

## 2018-04-18 NOTE — TELEPHONE ENCOUNTER
SPECIFIC Action To Be Taken: Referral change needed     2. Diagnosis/Clinical Reason for Request: Chronic right shoulder pain    3. Has an appt been made? No    4. Specialty & Provider Name/Lab/Imaging Location: Dr Azar/ [facility]     5. Patient approves a detailed message N\A    6. Patient preferred communication method: MyChart Voicemail Letter    7. Patient informed they will receive a return phone call from Primary Care office ONLY if there are any questions before processing their request and to call back if they haven't received a call in 5 days.

## 2018-05-10 DIAGNOSIS — M54.5 CHRONIC LOW BACK PAIN, UNSPECIFIED BACK PAIN LATERALITY, WITH SCIATICA PRESENCE UNSPECIFIED: ICD-10-CM

## 2018-05-10 DIAGNOSIS — G89.29 CHRONIC LOW BACK PAIN, UNSPECIFIED BACK PAIN LATERALITY, WITH SCIATICA PRESENCE UNSPECIFIED: ICD-10-CM

## 2018-05-10 RX ORDER — OXYCODONE AND ACETAMINOPHEN 10; 325 MG/1; MG/1
1 TABLET ORAL EVERY 6 HOURS PRN
Qty: 36 TAB | Refills: 0 | Status: SHIPPED | OUTPATIENT
Start: 2018-05-10 | End: 2018-05-19

## 2018-06-15 RX ORDER — LOSARTAN POTASSIUM AND HYDROCHLOROTHIAZIDE 12.5; 1 MG/1; MG/1
TABLET ORAL
Refills: 0 | OUTPATIENT
Start: 2018-06-15

## 2018-06-19 NOTE — ASSESSMENT & PLAN NOTE
Chronic condition: Patient has been treated for hypertension for several years. He is currently taking losartan-hydrochlorothiazide without any side effects and blood pressure is well controlled. He denies any chest pain, diaphoresis, shortness of breath, headaches, dizziness or blurred vision.   
Patient had surgery on the right shoulder November 2016. Problem is resolved and patient is in recovery.  
Reports difficulty staying asleep. He is able to fall asleep without assistance. Goes to bed  and wakes up 2 hours later without alarm. He admits to feeling tired and nonrefreshed in morning daytime fatigue.    
dorcas

## 2018-08-06 ENCOUNTER — OFFICE VISIT (OUTPATIENT)
Dept: MEDICAL GROUP | Facility: PHYSICIAN GROUP | Age: 43
End: 2018-08-06
Payer: COMMERCIAL

## 2018-08-06 VITALS
TEMPERATURE: 99.1 F | WEIGHT: 218.03 LBS | BODY MASS INDEX: 30.52 KG/M2 | OXYGEN SATURATION: 97 % | HEIGHT: 71 IN | DIASTOLIC BLOOD PRESSURE: 80 MMHG | HEART RATE: 70 BPM | SYSTOLIC BLOOD PRESSURE: 130 MMHG

## 2018-08-06 DIAGNOSIS — R53.83 FATIGUE, UNSPECIFIED TYPE: ICD-10-CM

## 2018-08-06 DIAGNOSIS — N52.9 ERECTILE DYSFUNCTION, UNSPECIFIED ERECTILE DYSFUNCTION TYPE: ICD-10-CM

## 2018-08-06 PROCEDURE — 99214 OFFICE O/P EST MOD 30 MIN: CPT | Performed by: FAMILY MEDICINE

## 2018-08-06 RX ORDER — TADALAFIL 10 MG/1
10 TABLET ORAL PRN
Qty: 60 TAB | Refills: 5 | Status: SHIPPED | OUTPATIENT
Start: 2018-08-06 | End: 2019-06-03 | Stop reason: SDUPTHER

## 2018-08-06 RX ORDER — DULOXETIN HYDROCHLORIDE 60 MG/1
60 CAPSULE, DELAYED RELEASE ORAL DAILY
COMMUNITY
End: 2018-11-05 | Stop reason: SDUPTHER

## 2018-08-06 RX ORDER — GABAPENTIN 300 MG/1
300 CAPSULE ORAL 3 TIMES DAILY
COMMUNITY
End: 2018-11-05 | Stop reason: SDUPTHER

## 2018-08-06 ASSESSMENT — PATIENT HEALTH QUESTIONNAIRE - PHQ9: CLINICAL INTERPRETATION OF PHQ2 SCORE: 0

## 2018-08-06 NOTE — PROGRESS NOTES
"Subjective:      Leo Manuel is a 43 y.o. male who presents with Fatigue and Medication Refill (cialis)            HPI     This is a 43-year-old white male patient of JASMYNE Dawson.  He said he will switch care to DANI[facility] Mercado and he has an appointment at the end of the month.  This is because he lives closer to our clinic.    He is here because he has been having problems with fatigue for the last 1 month.  His father had the same symptoms and was later on diagnosed with leukemia.  He wants to make sure he does not have this problem.  He said he also has some problems with sleep and he gets interrupted sleep.  He did his blood work this morning ordered by his PCP.    He takes multiple medications for chronic pain specifically right shoulder pain.  He was just started on Cymbalta and gabapentin 2 months ago.  He is also on oxycodone and tizanidine.  Disease is wondering if fatigue may be related to his medications.    Patient denies any weight loss, fever, chills, abdominal pain, nausea, vomiting.  He said he gets diarrhea 1-2 days a week.  Is wondering if this is also due to his medications.    He needs refill of Cialis.  He said he gets this from [facility].  He needs a printed prescription so he can send it to the pharmacy.    Past medical history, past surgical history, family history reviewed-no changes    Social history reviewed-he is in the process of quitting cigarettes.  He works as a .  He said he is exposed to radiation at work but they are being monitored for radiation exposure.    Allergies reviewed-no changes    Medications reviewed-no changes    ROS   As per HPI, the rest are negative.         Objective:     /80   Pulse 70   Temp 37.3 °C (99.1 °F)   Ht 1.803 m (5' 11\")   Wt 98.9 kg (218 lb 0.6 oz)   SpO2 97%   BMI 30.41 kg/m²      Physical Exam   Examined alert, awake, oriented, not in distress    Neck-supple, no lymphadenopathy, no " thyromegaly  Lungs-clear to auscultation, no rales, no wheezes  Heart-regular rate and rhythm, no murmur  Extremities-no edema, clubbing, cyanosis              Assessment/Plan:     1. Fatigue, unspecified type  He just had blood work done this morning that included CBC, CMP, lipid panel.  I will add TSH, vitamin D and testosterone level to complete the workup.  If the blood work does not show any abnormalities to explain the fatigue he may need further workup for sleep apnea which may cause problems with fatigue if he has interrupted sleep.  - TSH; Future  - VITAMIN D,25 HYDROXY; Future  - TESTOSTERONE SERUM; Future    2. Erectile dysfunction, unspecified erectile dysfunction type  I gave him a refill of Cialis so he can get it from a [facility].  - tadalafil (CIALIS) 10 MG tablet; Take 1 Tab by mouth as needed for Erectile Dysfunction.  Dispense: 60 Tab; Refill: 5    3. BMI 30.0-30.9,adult  We discussed diet, exercise and weight loss.  - Patient identified as having weight management issue.  Appropriate orders and counseling given.    Patient was seen for 25 minutes face to face of which > 50% of appointment time was spent on counseling and coordination of care regarding the above.    Please note that this dictation was created using voice recognition software. I have worked with consultants from the vendor as well as technical experts from [employer] to optimize the interface. I have made every reasonable attempt to correct obvious errors, but I expect that there are errors of grammar and possibly content I did not discover before finalizing the note.

## 2018-08-06 NOTE — PATIENT INSTRUCTIONS
Patient instructions given regarding labs, x-rays, medications, referral and followup appointment.

## 2018-08-07 LAB
ALBUMIN SERPL-MCNC: 4.3 G/DL (ref 3.5–5.5)
ALBUMIN/GLOB SERPL: 2.2 {RATIO} (ref 1.2–2.2)
ALP SERPL-CCNC: 89 IU/L (ref 39–117)
ALT SERPL-CCNC: 29 IU/L (ref 0–44)
AST SERPL-CCNC: 25 IU/L (ref 0–40)
BASOPHILS # BLD AUTO: 0 X10E3/UL (ref 0–0.2)
BASOPHILS NFR BLD AUTO: 0 %
BILIRUB SERPL-MCNC: 0.4 MG/DL (ref 0–1.2)
BUN SERPL-MCNC: 17 MG/DL (ref 6–24)
BUN/CREAT SERPL: 15 (ref 9–20)
CALCIUM SERPL-MCNC: 9.5 MG/DL (ref 8.7–10.2)
CHLORIDE SERPL-SCNC: 104 MMOL/L (ref 96–106)
CHOLEST SERPL-MCNC: 229 MG/DL (ref 100–199)
CO2 SERPL-SCNC: 23 MMOL/L (ref 20–29)
CREAT SERPL-MCNC: 1.13 MG/DL (ref 0.76–1.27)
EOSINOPHIL # BLD AUTO: 0.2 X10E3/UL (ref 0–0.4)
EOSINOPHIL NFR BLD AUTO: 3 %
ERYTHROCYTE [DISTWIDTH] IN BLOOD BY AUTOMATED COUNT: 13.4 % (ref 12.3–15.4)
ERYTHROCYTE [SEDIMENTATION RATE] IN BLOOD BY WESTERGREN METHOD: 2 MM/HR (ref 0–15)
GLOBULIN SER CALC-MCNC: 2 G/DL (ref 1.5–4.5)
GLUCOSE SERPL-MCNC: 88 MG/DL (ref 65–99)
HCT VFR BLD AUTO: 47.2 % (ref 37.5–51)
HDLC SERPL-MCNC: 40 MG/DL
HGB BLD-MCNC: 16.3 G/DL (ref 13–17.7)
IF AFRICAN AMERICAN  100797: 92 ML/MIN/1.73
IF NON AFRICAN AMER 100791: 79 ML/MIN/1.73
IMM GRANULOCYTES # BLD: 0 X10E3/UL (ref 0–0.1)
IMM GRANULOCYTES NFR BLD: 0 %
IMMATURE CELLS  115398: NORMAL
LABORATORY COMMENT REPORT: ABNORMAL
LDLC SERPL CALC-MCNC: 167 MG/DL (ref 0–99)
LYMPHOCYTES # BLD AUTO: 2 X10E3/UL (ref 0.7–3.1)
LYMPHOCYTES NFR BLD AUTO: 23 %
MCH RBC QN AUTO: 32.3 PG (ref 26.6–33)
MCHC RBC AUTO-ENTMCNC: 34.5 G/DL (ref 31.5–35.7)
MCV RBC AUTO: 94 FL (ref 79–97)
MONOCYTES # BLD AUTO: 0.6 X10E3/UL (ref 0.1–0.9)
MONOCYTES NFR BLD AUTO: 7 %
MORPHOLOGY BLD-IMP: NORMAL
NEUTROPHILS # BLD AUTO: 5.8 X10E3/UL (ref 1.4–7)
NEUTROPHILS NFR BLD AUTO: 67 %
NRBC BLD AUTO-RTO: NORMAL %
PLATELET # BLD AUTO: 249 X10E3/UL (ref 150–379)
POTASSIUM SERPL-SCNC: 4.2 MMOL/L (ref 3.5–5.2)
PROT SERPL-MCNC: 6.3 G/DL (ref 6–8.5)
RBC # BLD AUTO: 5.04 X10E6/UL (ref 4.14–5.8)
SODIUM SERPL-SCNC: 142 MMOL/L (ref 134–144)
TRIGL SERPL-MCNC: 109 MG/DL (ref 0–149)
VLDLC SERPL CALC-MCNC: 22 MG/DL (ref 5–40)
WBC # BLD AUTO: 8.7 X10E3/UL (ref 3.4–10.8)

## 2018-08-24 LAB
25(OH)D3+25(OH)D2 SERPL-MCNC: 30.3 NG/ML (ref 30–100)
TESTOST SERPL-MCNC: 337 NG/DL (ref 264–916)
TSH SERPL DL<=0.005 MIU/L-ACNC: 2.2 UIU/ML (ref 0.45–4.5)

## 2018-08-31 ENCOUNTER — OFFICE VISIT (OUTPATIENT)
Dept: MEDICAL GROUP | Facility: PHYSICIAN GROUP | Age: 43
End: 2018-08-31
Payer: COMMERCIAL

## 2018-08-31 VITALS
WEIGHT: 218 LBS | BODY MASS INDEX: 30.52 KG/M2 | OXYGEN SATURATION: 94 % | TEMPERATURE: 97.8 F | HEIGHT: 71 IN | SYSTOLIC BLOOD PRESSURE: 122 MMHG | DIASTOLIC BLOOD PRESSURE: 78 MMHG | RESPIRATION RATE: 18 BRPM | HEART RATE: 96 BPM

## 2018-08-31 DIAGNOSIS — J02.9 SORE THROAT: ICD-10-CM

## 2018-08-31 DIAGNOSIS — M25.561 CHRONIC PAIN OF RIGHT KNEE: ICD-10-CM

## 2018-08-31 DIAGNOSIS — I10 ESSENTIAL HYPERTENSION: ICD-10-CM

## 2018-08-31 DIAGNOSIS — J40 BRONCHITIS: ICD-10-CM

## 2018-08-31 DIAGNOSIS — M54.40 ACUTE RIGHT-SIDED LOW BACK PAIN WITH SCIATICA, SCIATICA LATERALITY UNSPECIFIED: ICD-10-CM

## 2018-08-31 DIAGNOSIS — D22.9 CHANGING NEVUS: ICD-10-CM

## 2018-08-31 DIAGNOSIS — G89.29 CHRONIC PAIN OF RIGHT KNEE: ICD-10-CM

## 2018-08-31 DIAGNOSIS — M25.511 RIGHT SHOULDER PAIN, UNSPECIFIED CHRONICITY: ICD-10-CM

## 2018-08-31 DIAGNOSIS — F51.01 PRIMARY INSOMNIA: ICD-10-CM

## 2018-08-31 PROBLEM — R05.9 COUGH: Status: ACTIVE | Noted: 2018-08-31

## 2018-08-31 LAB
INT CON NEG: NEGATIVE
INT CON POS: POSITIVE
S PYO AG THROAT QL: NORMAL

## 2018-08-31 PROCEDURE — 87880 STREP A ASSAY W/OPTIC: CPT | Performed by: NURSE PRACTITIONER

## 2018-08-31 PROCEDURE — 99214 OFFICE O/P EST MOD 30 MIN: CPT | Performed by: NURSE PRACTITIONER

## 2018-08-31 RX ORDER — ALBUTEROL SULFATE 90 UG/1
2 AEROSOL, METERED RESPIRATORY (INHALATION) EVERY 6 HOURS PRN
Qty: 8.5 G | Refills: 3 | Status: SHIPPED | OUTPATIENT
Start: 2018-08-31 | End: 2021-08-19

## 2018-08-31 RX ORDER — LOSARTAN POTASSIUM AND HYDROCHLOROTHIAZIDE 12.5; 1 MG/1; MG/1
1 TABLET ORAL
Qty: 90 TAB | Refills: 0 | Status: SHIPPED | OUTPATIENT
Start: 2018-08-31 | End: 2018-09-25 | Stop reason: SDUPTHER

## 2018-08-31 RX ORDER — TRAZODONE HYDROCHLORIDE 100 MG/1
100 TABLET ORAL NIGHTLY PRN
Qty: 30 TAB | Refills: 0 | Status: SHIPPED | OUTPATIENT
Start: 2018-08-31 | End: 2018-09-25 | Stop reason: SDUPTHER

## 2018-08-31 RX ORDER — AZITHROMYCIN 250 MG/1
TABLET, FILM COATED ORAL
Qty: 6 TAB | Refills: 0 | Status: SHIPPED | OUTPATIENT
Start: 2018-08-31 | End: 2018-09-25

## 2018-08-31 ASSESSMENT — PAIN SCALES - GENERAL: PAINLEVEL: NO PAIN

## 2018-08-31 NOTE — ASSESSMENT & PLAN NOTE
Difficulty with sleep, states he wakes up multiple times per night. States he is unsure what causes issues with sleep. Tried ambien, states it was not helpful. States he took trazodone and states that it helped, but did not keep him asleep.

## 2018-08-31 NOTE — ASSESSMENT & PLAN NOTE
Chronic in nature. Reports intermittent swelling. Denies popping/crepitus/instabilty. States he is not concerned with this issue at this time.

## 2018-09-01 NOTE — PROGRESS NOTES
"Chief Complaint   Patient presents with   • Blood Pressure Problem   • Pharyngitis     x 5 days    • Generalized Body Aches     x 5 days    • Sweats     x 5 days        HISTORY OF PRESENT ILLNESS: Patient is a 43 y.o. male established patient who presents today to discuss cough and congestion.    Chronic pain of right knee  Chronic in nature. Reports intermittent swelling. Denies popping/crepitus/instabilty. States he is not concerned with this issue at this time.     Right-sided low back pain with sciatica  Occasional. Sciatica 1x every couple months.     Primary insomnia  Difficulty with sleep, states he wakes up multiple times per night. States he is unsure what causes issues with sleep. Tried ambien, states it was not helpful. States he took trazodone and states that it helped, but did not keep him asleep.     Bronchitis  This is a new issue. Started 6 days ago. States it started with sore throat, congestion, sinus pain/pressure, fever and chills today. States that cough has gotten worse, deep and productive, green and yellow sputum, has noticed wheezing, myalgias.  no n/v, confusion.    Changing nevus  Chronic in nature. Patient reports non-healing sores on Left forearm there are 2 superior to the wrist. Patient is unsure of when they were initially noted, but they have been present at least 6 months. States that they have gotten larger.  with chronic sun exposure. Has tried OTC ointments and salves without improvement.     Hypertension  Chronic in nature. Stable. 122/78 today, patient states he is confused by this number as \"my BP is always high!\" states he has been out of medication x2 weeks. Patient denies chest pain, palpitations, dizziness, HA.       Patient Active Problem List    Diagnosis Date Noted   • Bronchitis 08/31/2018   • Changing nevus 08/31/2018   • Nicotine addiction 10/18/2017   • Chronic pain of right knee 10/18/2017   • Other chronic pain 10/18/2017   • Environmental " allergies 06/07/2017   • Right-sided low back pain with sciatica 06/07/2017   • ED (erectile dysfunction) 06/07/2017   • Primary insomnia 03/01/2017   • Obesity (BMI 30-39.9) 03/01/2017   • Right shoulder pain 11/29/2016   • Hypertension 08/19/2016   • Bursitis of right shoulder 08/19/2016       Allergies:Shellfish allergy    Current Outpatient Prescriptions   Medication Sig Dispense Refill   • traZODone (DESYREL) 100 MG Tab Take 1 Tab by mouth at bedtime as needed for Sleep. 30 Tab 0   • losartan-hydrochlorothiazide (HYZAAR) 100-12.5 MG per tablet Take 1 Tab by mouth every day. 90 Tab 0   • azithromycin (ZITHROMAX) 250 MG Tab 500 mg on day 1 and then 250 mg x 4 days. 6 Tab 0   • albuterol 108 (90 Base) MCG/ACT Aero Soln inhalation aerosol Inhale 2 Puffs by mouth every 6 hours as needed. 8.5 g 3   • DULoxetine (CYMBALTA) 60 MG Cap DR Particles delayed-release capsule Take 60 mg by mouth every day.     • gabapentin (NEURONTIN) 300 MG Cap Take 300 mg by mouth 3 times a day.     • tadalafil (CIALIS) 10 MG tablet Take 1 Tab by mouth as needed for Erectile Dysfunction. 60 Tab 5   • Calcium Carbonate Antacid (TUMS PO) Take  by mouth as needed.     • tizanidine (ZANAFLEX) 4 MG Tab Take 4 mg by mouth at bedtime as needed.       No current facility-administered medications for this visit.        Social History   Substance Use Topics   • Smoking status: Current Every Day Smoker     Packs/day: 0.25     Years: 6.00     Types: Cigarettes   • Smokeless tobacco: Never Used   • Alcohol use No       Family Status   Relation Status   • Mo Alive   • Fa Alive   • Bro Alive   • Bro Alive     Family History   Problem Relation Age of Onset   • Lung Disease Mother         COPD   • Arthritis Mother         RA   • Cancer Father         leukemia, prostate cancer   • No Known Problems Brother    • No Known Problems Brother        Review of Systems:   Constitutional:  Positive for fever, chills, negative weight loss and positivemalaise/fatigue.  "  HEENT:  Negative for ear pain, nosebleeds, congestion, sore throat and neck pain.    Respiratory:  Positive for cough, sputum production, shortness of breath and wheezing.    Cardiovascular:  Negative for chest pain, palpitations, orthopnea and leg swelling.   Gastrointestinal: Negative for heartburn, nausea, vomiting and abdominal pain.   Genitourinary: Negative for dysuria, urgency and frequency.   Musculoskeletal:  Negative for myalgias, back pain and joint pain.   Skin:  Negative for rash and itching. Positive lesion.  Neurological: Negative for dizziness, tingling, tremors, sensory change, focal weakness and headaches.   Endo/Heme/Allergies: Does not bruise/bleed easily.   Psychiatric/Behavioral:  Negative for depression, suicidal ideas and memory loss.  The patient is not nervous/anxious and does have insomnia.    All other systems reviewed and are negative except as in HPI.    Exam:  Blood pressure 122/78, pulse 96, temperature 36.6 °C (97.8 °F), resp. rate 18, height 1.803 m (5' 11\"), weight 98.9 kg (218 lb), SpO2 94 %.  General:  Normal appearing. No distress.  HEENT:  Normocephalic. Eyes conjunctiva clear lids without ptosis, pupils equal and reactive to light accommodation, ears normal shape and contour, canals are clear bilaterally, tympanic membranes are benign, nasal mucosa benign, oropharynx is without erythema, edema or exudates.   Neck: Supple without JVD or bruit. Thyroid is not enlarged.  Pulmonary: Wheezing GIANCARLO, LLL.  Normal effort. No rales, ronchi.  Cardiovascular: Regular rate and rhythm without murmur. Carotid and radial pulses are intact and equal bilaterally.  Neurologic:Grossly nonfocal  Lymph: No cervical, supraclavicular or axillary lymph nodes are palpable  Skin: Warm and dry.  2 red, scaling scabbed lesions on left forearm superior to wrist. Approximately the size of a pencil eraser.   Musculoskeletal: Normal gait. No extremity cyanosis, clubbing, or edema.  Psych: Normal mood and " affect. Alert and oriented x3. Judgment and insight is normal.      PLAN:    1. Chronic pain of right knee  Will monitor.    2. Right shoulder pain, unspecified chronicity  Will monitor    3. Acute right-sided low back pain with sciatica, sciatica laterality unspecified  Will monitor.    4. Primary insomnia  Plan to try trazodone. Discussed risks, benefits, side effects  - traZODone (DESYREL) 100 MG Tab; Take 1 Tab by mouth at bedtime as needed for Sleep.  Dispense: 30 Tab; Refill: 0    5. Bronchitis  Counseled regarding risks, side effects, benefits of medications, discussed proper inhaler use  Counseled regarding OTC treatment of symptoms.   - azithromycin (ZITHROMAX) 250 MG Tab; 500 mg on day 1 and then 250 mg x 4 days.  Dispense: 6 Tab; Refill: 0  - albuterol 108 (90 Base) MCG/ACT Aero Soln inhalation aerosol; Inhale 2 Puffs by mouth every 6 hours as needed.  Dispense: 8.5 g; Refill: 3    6. Essential hypertension  Refilled. Patient will monitor for signs of low BP, monitor BP.  - losartan-hydrochlorothiazide (HYZAAR) 100-12.5 MG per tablet; Take 1 Tab by mouth every day.  Dispense: 90 Tab; Refill: 0    7. Changing nevus  Plan to biopsy    8. Sore throat  negative  - POCT Rapid Strep A    Follow-up in 1 month LONG. Biopsy and HTN. Patient is encouraged to be seen in the emergency room for chest pain, palpitations, shortness of breath, dizziness, severe abdominal pain or other concerning symptoms.      Please note that this dictation was created using voice recognition software. I have made every reasonable attempt to correct obvious errors, but I expect that there are errors of grammar and possibly content that I did not discover before finalizing the note.    Assessment/Plan:  1. Chronic pain of right knee     2. Right shoulder pain, unspecified chronicity     3. Acute right-sided low back pain with sciatica, sciatica laterality unspecified     4. Primary insomnia  traZODone (DESYREL) 100 MG Tab   5. Bronchitis   azithromycin (ZITHROMAX) 250 MG Tab    albuterol 108 (90 Base) MCG/ACT Aero Soln inhalation aerosol   6. Essential hypertension  losartan-hydrochlorothiazide (HYZAAR) 100-12.5 MG per tablet   7. Changing nevus     8. Sore throat  POCT Rapid Strep A          I have placed the below orders and discussed them with an approved delegating provider. The MA is performing the below orders under the direction of Dr. Macedo.

## 2018-09-01 NOTE — ASSESSMENT & PLAN NOTE
This is a new issue. Started 6 days ago. States it started with sore throat, congestion, sinus pain/pressure, fever and chills today. States that cough has gotten worse, deep and productive, green and yellow sputum, has noticed wheezing, myalgias.  no n/v, confusion.

## 2018-09-01 NOTE — ASSESSMENT & PLAN NOTE
Chronic in nature. Patient reports non-healing sores on Left forearm there are 2 superior to the wrist. Patient is unsure of when they were initially noted, but they have been present at least 6 months. States that they have gotten larger.  with chronic sun exposure. Has tried OTC ointments and salves without improvement.

## 2018-09-02 NOTE — ASSESSMENT & PLAN NOTE
"Chronic in nature. Stable. 122/78 today, patient states he is confused by this number as \"my BP is always high!\" states he has been out of medication x2 weeks. Patient denies chest pain, palpitations, dizziness, HA.   "

## 2018-09-20 ENCOUNTER — TELEPHONE (OUTPATIENT)
Dept: MEDICAL GROUP | Facility: PHYSICIAN GROUP | Age: 43
End: 2018-09-20

## 2018-09-20 DIAGNOSIS — G89.29 CHRONIC RIGHT SHOULDER PAIN: ICD-10-CM

## 2018-09-20 DIAGNOSIS — M25.511 CHRONIC RIGHT SHOULDER PAIN: ICD-10-CM

## 2018-09-20 NOTE — TELEPHONE ENCOUNTER
Phone Number Called: [phone] (home)     Message: Pt notified referral signed and takes 3-5 days for approval from insurance.     Left Message for patient to call back: N\A

## 2018-09-20 NOTE — TELEPHONE ENCOUNTER
----- Message from Leo Manuel sent at 9/20/2018  4:06 PM PDT -----  Regarding: Non-Urgent Medical Question  Contact: [phone]  Could you please send a referral to Dr. Matthew Olivares for pain management. I had a falling out with [facility] and I’m running out of my medications. Thank you

## 2018-09-20 NOTE — TELEPHONE ENCOUNTER
Ash, if ok for referral I have pended it to sign.    Referred to 2 pain clinics in the past on 1/22/18 & 4/18/18.

## 2018-09-21 ENCOUNTER — HOSPITAL ENCOUNTER (OUTPATIENT)
Dept: RADIOLOGY | Facility: MEDICAL CENTER | Age: 43
End: 2018-09-21
Attending: CHIROPRACTOR
Payer: COMMERCIAL

## 2018-09-21 DIAGNOSIS — M99.02 SEGMENTAL AND SOMATIC DYSFUNCTION OF THORACIC REGION: ICD-10-CM

## 2018-09-21 DIAGNOSIS — M99.03 SEGMENTAL AND SOMATIC DYSFUNCTION OF LUMBAR REGION: ICD-10-CM

## 2018-09-21 DIAGNOSIS — M54.6 PAIN IN THORACIC SPINE: ICD-10-CM

## 2018-09-21 DIAGNOSIS — M54.5 LOW BACK PAIN, UNSPECIFIED BACK PAIN LATERALITY, UNSPECIFIED CHRONICITY, WITH SCIATICA PRESENCE UNSPECIFIED: ICD-10-CM

## 2018-09-21 DIAGNOSIS — M99.05 SEGMENTAL AND SOMATIC DYSFUNCTION OF PELVIC REGION: ICD-10-CM

## 2018-09-21 DIAGNOSIS — M99.08 SEGMENTAL AND SOMATIC DYSFUNCTION OF RIB CAGE: ICD-10-CM

## 2018-09-21 DIAGNOSIS — M62.830 MUSCLE SPASM OF BACK: ICD-10-CM

## 2018-09-21 DIAGNOSIS — M54.16 RADICULOPATHY, LUMBAR REGION: ICD-10-CM

## 2018-09-21 DIAGNOSIS — M99.04 SEGMENTAL AND SOMATIC DYSFUNCTION OF SACRAL REGION: ICD-10-CM

## 2018-09-21 PROCEDURE — 72148 MRI LUMBAR SPINE W/O DYE: CPT

## 2018-09-25 ENCOUNTER — OFFICE VISIT (OUTPATIENT)
Dept: MEDICAL GROUP | Facility: PHYSICIAN GROUP | Age: 43
End: 2018-09-25
Payer: COMMERCIAL

## 2018-09-25 VITALS
WEIGHT: 218 LBS | DIASTOLIC BLOOD PRESSURE: 82 MMHG | HEART RATE: 104 BPM | SYSTOLIC BLOOD PRESSURE: 114 MMHG | BODY MASS INDEX: 30.52 KG/M2 | OXYGEN SATURATION: 96 % | HEIGHT: 71 IN | RESPIRATION RATE: 16 BRPM | TEMPERATURE: 98.9 F

## 2018-09-25 DIAGNOSIS — Z79.891 CHRONIC USE OF OPIATE DRUGS THERAPEUTIC PURPOSES: ICD-10-CM

## 2018-09-25 DIAGNOSIS — M54.40 ACUTE RIGHT-SIDED LOW BACK PAIN WITH SCIATICA, SCIATICA LATERALITY UNSPECIFIED: ICD-10-CM

## 2018-09-25 DIAGNOSIS — M25.511 RIGHT SHOULDER PAIN, UNSPECIFIED CHRONICITY: ICD-10-CM

## 2018-09-25 DIAGNOSIS — I10 ESSENTIAL HYPERTENSION: ICD-10-CM

## 2018-09-25 DIAGNOSIS — F51.01 PRIMARY INSOMNIA: ICD-10-CM

## 2018-09-25 PROCEDURE — 99214 OFFICE O/P EST MOD 30 MIN: CPT | Performed by: NURSE PRACTITIONER

## 2018-09-25 RX ORDER — OXYCODONE HYDROCHLORIDE 10 MG/1
10 TABLET ORAL EVERY 6 HOURS PRN
Qty: 150 TAB | Refills: 0 | Status: SHIPPED | OUTPATIENT
Start: 2018-09-25 | End: 2018-10-25

## 2018-09-25 RX ORDER — LOSARTAN POTASSIUM AND HYDROCHLOROTHIAZIDE 12.5; 1 MG/1; MG/1
1 TABLET ORAL
Qty: 90 TAB | Refills: 3 | Status: SHIPPED | OUTPATIENT
Start: 2018-09-25 | End: 2019-10-06 | Stop reason: SDUPTHER

## 2018-09-25 RX ORDER — TRAZODONE HYDROCHLORIDE 100 MG/1
100 TABLET ORAL NIGHTLY PRN
Qty: 90 TAB | Refills: 3 | Status: SHIPPED | OUTPATIENT
Start: 2018-09-25 | End: 2018-12-03

## 2018-09-25 ASSESSMENT — PAIN SCALES - GENERAL: PAINLEVEL: 8=MODERATE-SEVERE PAIN

## 2018-09-25 NOTE — LETTER
September 25, 2018        Leo Manuel  Current Outpatient Prescriptions   Medication Sig Dispense Refill   • traZODone (DESYREL) 100 MG Tab Take 1 Tab by mouth at bedtime as needed for Sleep. 90 Tab 3   • losartan-hydrochlorothiazide (HYZAAR) 100-12.5 MG per tablet Take 1 Tab by mouth every day. 90 Tab 3   • oxyCODONE immediate release (ROXICODONE) 10 MG immediate release tablet Take 1 Tab by mouth every 6 hours as needed for Moderate Pain for up to 30 days. 150 Tab 0   • azithromycin (ZITHROMAX) 250 MG Tab 500 mg on day 1 and then 250 mg x 4 days. 6 Tab 0   • albuterol 108 (90 Base) MCG/ACT Aero Soln inhalation aerosol Inhale 2 Puffs by mouth every 6 hours as needed. 8.5 g 3   • DULoxetine (CYMBALTA) 60 MG Cap DR Particles delayed-release capsule Take 60 mg by mouth every day.     • gabapentin (NEURONTIN) 300 MG Cap Take 300 mg by mouth 3 times a day.     • tadalafil (CIALIS) 10 MG tablet Take 1 Tab by mouth as needed for Erectile Dysfunction. 60 Tab 5   • Calcium Carbonate Antacid (TUMS PO) Take  by mouth as needed.     • tizanidine (ZANAFLEX) 4 MG Tab Take 4 mg by mouth at bedtime as needed.       No current facility-administered medications for this visit.

## 2018-09-26 NOTE — ASSESSMENT & PLAN NOTE
Chronic pain recheck:   Last dose of controlled substance: 2 days ago, states he has been having a lot of nausea, dizziness, headaches, vomiting which he attributes to withdrawal symptoms. fill date should have been 9/24, some concern for overuse. Patient was dismissed from previous pain management provider related to a drug test coming back positive for dilaudid.  Patient states that he has never used this medication and is unsure how this came back on his results.  Patient states that he used marijuana trying to decrease withdrawal symptoms.  Discussed with patient that we follow federal controlled substance guidelines and will not prescribe pain medication for patients who screen positive for marijuana, drug screen will be obtained today.  Chronic pain treated with oxycodone taken 4-5 times a day    He  reports that he does not drink alcohol.  He  reports that he does not use drugs.    Consequences of Chronic Opiate therapy:  (5 A's)  Analgesia: Compared to no treatment or prior treatment, pain is currently improved  Activity: improved  Adverse Events: He denies constipation, dry mouth, itchy skin, nausea and sedation  Aberrant Behaviors: He reports he is taking medication as prescribed and is not veering from agreed treatment regimen. There have been no inappropriate refills or lost/stolen meds reported.   Affect/Mood: Pain is impacting patient's mood.  Patient denies depression/anxiety.    Nonnarcotic treatments that are being used: Tylenol, NSAIDs/MARTÍNEZ-2, Gabapentin, SSRI/SNRI, topical agents, chiropractor , ice and heat.   Treatment goals discussed.    Opioid Risk Score: 5    Interpretation of Opioid Risk Score   Score 0-3 = Low risk of abuse. Do UDS at least once per year.  Score 4-7 = Moderate risk of abuse. Do UDS 1-4 times per year.  Score 8+ = High risk of abuse. Refer to specialist.    Last order of CONTROLLED SUBSTANCE TREATMENT AGREEMENT was found on 10/18/2017 from Office Visit on 10/18/2017     UDS  Summary     Patient has no health maintenance due at this time        Most recent UDS is not reviewed as it was completed at his pain management doctors office patient does state that 1 of his recent previous UDS is came back positive for Dilaudid and negative for oxycodone patient states surrounding that drug screen he had been taking his medication.  Concern for inappropriate use of medication.  Recent MRI shows multiple concerning issues and patient is currently referred to neurosurgery.  Discussed with patient that we are unable to manage his pain medication through this office long-term, discussed that this would be a one time refill and that he would need to get established with a new pain management physician as soon as possible.  Will review urine drug screen as soon as possible.    I have reviewed the medical records, the Prescription Monitoring Program and I have determined that controlled substance treatment is medically indicated.  Discussed with patient that this is a bridge prescription, primary care will not be taking over patient's pain medication prescription.

## 2018-09-26 NOTE — PROGRESS NOTES
Chief Complaint   Patient presents with   • Sleep Problem     better now with medication    • Pain     look for medication Back R side/ Weakness    • Medication Refill     BP        HISTORY OF PRESENT ILLNESS: Patient is a 43 y.o. male established patient who presents today to discuss pain management    Chronic use of opiate drugs therapeutic purposes  Chronic pain recheck:   Last dose of controlled substance: 2 days ago, states he has been having a lot of nausea, dizziness, headaches, vomiting which he attributes to withdrawal symptoms. fill date should have been 9/24, some concern for overuse. Patient was dismissed from previous pain management provider related to a drug test coming back positive for dilaudid.  Patient states that he has never used this medication and is unsure how this came back on his results.  Patient states that he used marijuana trying to decrease withdrawal symptoms.  Discussed with patient that we follow federal controlled substance guidelines and will not prescribe pain medication for patients who screen positive for marijuana, drug screen will be obtained today.  Chronic pain treated with oxycodone taken 4-5 times a day    He  reports that he does not drink alcohol.  He  reports that he does not use drugs.    Consequences of Chronic Opiate therapy:  (5 A's)  Analgesia: Compared to no treatment or prior treatment, pain is currently improved  Activity: improved  Adverse Events: He denies constipation, dry mouth, itchy skin, nausea and sedation  Aberrant Behaviors: He reports he is taking medication as prescribed and is not veering from agreed treatment regimen. There have been no inappropriate refills or lost/stolen meds reported.   Affect/Mood: Pain is impacting patient's mood.  Patient denies depression/anxiety.    Nonnarcotic treatments that are being used: Tylenol, NSAIDs/MARTÍNEZ-2, Gabapentin, SSRI/SNRI, topical agents, chiropractor , ice and heat.   Treatment goals discussed.    Opioid  Risk Score: 5    Interpretation of Opioid Risk Score   Score 0-3 = Low risk of abuse. Do UDS at least once per year.  Score 4-7 = Moderate risk of abuse. Do UDS 1-4 times per year.  Score 8+ = High risk of abuse. Refer to specialist.    Last order of CONTROLLED SUBSTANCE TREATMENT AGREEMENT was found on 10/18/2017 from Office Visit on 10/18/2017     UDS Summary     Patient has no health maintenance due at this time        Most recent UDS is not reviewed as it was completed at his pain management doctors office patient does state that 1 of his recent previous UDS is came back positive for Dilaudid and negative for oxycodone patient states surrounding that drug screen he had been taking his medication.  Concern for inappropriate use of medication.  Recent MRI shows multiple concerning issues and patient is currently referred to neurosurgery.  Discussed with patient that we are unable to manage his pain medication through this office long-term, discussed that this would be a one time refill and that he would need to get established with a new pain management physician as soon as possible.  Will review urine drug screen as soon as possible.    I have reviewed the medical records, the Prescription Monitoring Program and I have determined that controlled substance treatment is medically indicated.  Discussed with patient that this is a bridge prescription, primary care will not be taking over patient's pain medication prescription.      Right-sided low back pain with sciatica  Chronic in nature.  Severe.  Patient states that he followed up with his chiropractor who did an adjustment patient states that his chiropractor request that he complete MRI based on MRI results chiropractor states patient needs to see a neurosurgeon.  Patient was recently fired from pain management clinic.  Patient states he has been out of medication for a couple of days, fill date should have been yesterday.  Some concern with misuse of  medication, patient will be monitored closely and is referred to Dr. Olivares for further pain management.  Patient states that he has severe sharp pains, right-sided sciatica which has been more severe recently.  MRI completed 9/21/2018 indicates:    T12/L1: No central or foraminal stenosis. There is ligamentum flavum redundancy.  L1/2: No central or foraminal stenosis. There is ligamentum flavum redundancy.  L2/3: No central or foraminal stenosis. There is ligamentum flavum redundancy.  L3/4: There is a large right paracentral extrusion which extends 16 mm caudad. This fragment may be frankly sequestered. This measures 8 x 8 mm short axis and exerts mass effect upon the right L4 nerve root. No significant foraminal stenosis. Mild facet   arthropathy and ligamentum flavum redundancy  L4/5: Asymmetric facet examination with some spurring. Moderate degenerative disc disease with disc height loss, circumferential bulge. Mild spurring. Borderline grade 1 anterolisthesis. Borderline lateral recess stenosis. Right foraminal moderate   stenosis from the extruded fragment above  L5/S1: Hypoplastic disc. No stenosis.    Patient is referred to follow-up with Dr. Rodriguez and neurosurgery, Dr. Olivares pain management.    Hypertension  Chronic in nature.  Stable.  Patient blood pressure within normal limits today.  Patient continues to take medication which is working well.  Patient denies chest pain, palpitations, dizziness, blurry vision.  Patient is requesting refill of medication today.      Patient Active Problem List    Diagnosis Date Noted   • Chronic use of opiate drugs therapeutic purposes 09/25/2018   • Bronchitis 08/31/2018   • Changing nevus 08/31/2018   • Nicotine addiction 10/18/2017   • Chronic pain of right knee 10/18/2017   • Other chronic pain 10/18/2017   • Environmental allergies 06/07/2017   • Right-sided low back pain with sciatica 06/07/2017   • ED (erectile dysfunction) 06/07/2017   • Primary insomnia  03/01/2017   • Obesity (BMI 30-39.9) 03/01/2017   • Right shoulder pain 11/29/2016   • Hypertension 08/19/2016   • Bursitis of right shoulder 08/19/2016       Allergies:Shellfish allergy    Current Outpatient Prescriptions   Medication Sig Dispense Refill   • traZODone (DESYREL) 100 MG Tab Take 1 Tab by mouth at bedtime as needed for Sleep. 90 Tab 3   • losartan-hydrochlorothiazide (HYZAAR) 100-12.5 MG per tablet Take 1 Tab by mouth every day. 90 Tab 3   • oxyCODONE immediate release (ROXICODONE) 10 MG immediate release tablet Take 1 Tab by mouth every 6 hours as needed for Moderate Pain for up to 30 days. 150 Tab 0   • albuterol 108 (90 Base) MCG/ACT Aero Soln inhalation aerosol Inhale 2 Puffs by mouth every 6 hours as needed. 8.5 g 3   • DULoxetine (CYMBALTA) 60 MG Cap DR Particles delayed-release capsule Take 60 mg by mouth every day.     • gabapentin (NEURONTIN) 300 MG Cap Take 300 mg by mouth 3 times a day.     • tadalafil (CIALIS) 10 MG tablet Take 1 Tab by mouth as needed for Erectile Dysfunction. 60 Tab 5   • Calcium Carbonate Antacid (TUMS PO) Take  by mouth as needed.     • tizanidine (ZANAFLEX) 4 MG Tab Take 4 mg by mouth at bedtime as needed.       No current facility-administered medications for this visit.        Social History   Substance Use Topics   • Smoking status: Current Every Day Smoker     Packs/day: 0.25     Years: 6.00     Types: Cigarettes   • Smokeless tobacco: Never Used   • Alcohol use No       Family Status   Relation Status   • Mo Alive   • Fa Alive   • Bro Alive   • Bro Alive     Family History   Problem Relation Age of Onset   • Lung Disease Mother         COPD   • Arthritis Mother         RA   • Cancer Father         leukemia, prostate cancer   • No Known Problems Brother    • No Known Problems Brother        Review of Systems:   Constitutional:  Negative for fever, chills, weight loss and malaise/fatigue.   HEENT:  Negative for ear pain, nosebleeds, congestion, sore throat and neck  "pain.    Eyes:  Negative for blurred vision.   Respiratory:  Negative for cough, sputum production, shortness of breath and wheezing.    Cardiovascular:  Negative for chest pain, palpitations, orthopnea and leg swelling.   Gastrointestinal:  Negative for heartburn, nausea, vomiting and abdominal pain.   Genitourinary:  Negative for dysuria, urgency and frequency.   Musculoskeletal: Positive for myalgias, back pain and joint pain.   Skin:  Negative for rash and itching.   Neurological:  Negative for dizziness, tingling, tremors, sensory change, focal weakness and headaches.   Endo/Heme/Allergies:  Does not bruise/bleed easily.   Psychiatric/Behavioral:  Negative for depression, suicidal ideas and memory loss.  The patient is not nervous/anxious and does not have insomnia.    All other systems reviewed and are negative except as in HPI.    Exam:  Blood pressure 114/82, pulse (!) 104, temperature 37.2 °C (98.9 °F), temperature source Temporal, resp. rate 16, height 1.803 m (5' 11\"), weight 98.9 kg (218 lb), SpO2 96 %.  General: Fatigue appearing. No distress.  Pulmonary:  Clear to ausculation.  Normal effort. No rales, ronchi, or wheezing.  Cardiovascular:  Regular rate and rhythm without murmur. Carotid and radial pulses are intact and equal bilaterally.  Abdomen:  Soft, nontender, nondistended. Normal bowel sounds. Liver and spleen are not palpable  Neurologic:  Grossly nonfocal  Lymph:  No cervical, supraclavicular or axillary lymph nodes are palpable  Skin:  Warm and dry.  No obvious lesions.  Musculoskeletal:  Normal antalgic, high-stepping, patient noted to limp, swing right lower leg. No extremity cyanosis, clubbing, or edema. SPINE: No significant spinal curvature on forward bend.  Moderate to severe tenderness in paraspinous muscles lumbar spine with current spasm. SLT positive. Strength 3/5 proximal and distal RLE.  5/5 left lower extremity.  Decreased sensation is noted in right foot, right lower leg. " "  Psych:  Normal mood and affect. Alert and oriented x3. Judgment and insight is normal.      PLAN:    2. Primary insomnia  Continue current medication.  - traZODone (DESYREL) 100 MG Tab; Take 1 Tab by mouth at bedtime as needed for Sleep.  Dispense: 90 Tab; Refill: 3    3. Essential hypertension  Continue current medication.  - losartan-hydrochlorothiazide (HYZAAR) 100-12.5 MG per tablet; Take 1 Tab by mouth every day.  Dispense: 90 Tab; Refill: 3    4. Right shoulder pain, unspecified chronicity  5. Chronic use of opiate drugs therapeutic purposes  Patient understands this prescription is a controlled substance which is potentially habit-forming and its use is regulated by the [place]. We also discussed the new \"black box\" warning regarding the lethal combination of opioids and benzodiazepines.Any refill requires an office visit. This medicine can cause nausea, significant constipation, sedation, confusion and accidental overdose.  Patient understands that primary care will not be assuming this prescription and that he will be expected to follow-up with pain management to continue this medication discussed with patient that refill will be dependent on results of drug screen  - [facility] SCREEN; Future  - oxyCODONE immediate release (ROXICODONE) 10 MG immediate release tablet; Take 1 Tab by mouth every 6 hours as needed for Moderate Pain for up to 30 days.  Dispense: 150 Tab; Refill: 0  - Consent for Opiate Prescription    6. Acute right-sided low back pain with sciatica, sciatica laterality unspecified  Related to MRI results patient needs to follow-up with neurosurgery, follow-up with new pain management physician.  - [facility] SCREEN; Future  - oxyCODONE immediate release (ROXICODONE) 10 MG immediate release tablet; Take 1 Tab by mouth every 6 hours as needed for Moderate Pain for up to 30 days.  Dispense: 150 Tab; Refill: 0    Follow-up in 1 month or sooner as needed. Patient is " encouraged to be seen in the emergency room for chest pain, palpitations, shortness of breath, dizziness, severe abdominal pain or other concerning symptoms.    Please note that this dictation was created using voice recognition software. I have made every reasonable attempt to correct obvious errors, but I expect that there are errors of grammar and possibly content that I did not discover before finalizing the note.    Assessment/Plan:  1. Primary insomnia  traZODone (DESYREL) 100 MG Tab   2. Essential hypertension  losartan-hydrochlorothiazide (HYZAAR) 100-12.5 MG per tablet   3. Right shoulder pain, unspecified chronicity  oxyCODONE immediate release (ROXICODONE) 10 MG immediate release tablet   4. Chronic use of opiate drugs therapeutic purposes  [facility] SCREEN    oxyCODONE immediate release (ROXICODONE) 10 MG immediate release tablet    Consent for Opiate Prescription   5. Acute right-sided low back pain with sciatica, sciatica laterality unspecified  [facility] SCREEN    oxyCODONE immediate release (ROXICODONE) 10 MG immediate release tablet          I have placed the below orders and discussed them with an approved delegating provider. The MA is performing the below orders under the direction of Dr. Laird.

## 2018-09-26 NOTE — ASSESSMENT & PLAN NOTE
Chronic in nature.  Stable.  Patient blood pressure within normal limits today.  Patient continues to take medication which is working well.  Patient denies chest pain, palpitations, dizziness, blurry vision.  Patient is requesting refill of medication today.

## 2018-09-26 NOTE — ASSESSMENT & PLAN NOTE
Chronic in nature.  Severe.  Patient states that he followed up with his chiropractor who did an adjustment patient states that his chiropractor request that he complete MRI based on MRI results chiropractor states patient needs to see a neurosurgeon.  Patient was recently fired from pain management clinic.  Patient states he has been out of medication for a couple of days, fill date should have been yesterday.  Some concern with misuse of medication, patient will be monitored closely and is referred to Dr. Olivares for further pain management.  Patient states that he has severe sharp pains, right-sided sciatica which has been more severe recently.  MRI completed 9/21/2018 indicates:    T12/L1: No central or foraminal stenosis. There is ligamentum flavum redundancy.  L1/2: No central or foraminal stenosis. There is ligamentum flavum redundancy.  L2/3: No central or foraminal stenosis. There is ligamentum flavum redundancy.  L3/4: There is a large right paracentral extrusion which extends 16 mm caudad. This fragment may be frankly sequestered. This measures 8 x 8 mm short axis and exerts mass effect upon the right L4 nerve root. No significant foraminal stenosis. Mild facet   arthropathy and ligamentum flavum redundancy  L4/5: Asymmetric facet examination with some spurring. Moderate degenerative disc disease with disc height loss, circumferential bulge. Mild spurring. Borderline grade 1 anterolisthesis. Borderline lateral recess stenosis. Right foraminal moderate   stenosis from the extruded fragment above  L5/S1: Hypoplastic disc. No stenosis.    Patient is referred to follow-up with Dr. Rodriguez and neurosurgery, Dr. Olivares pain management.

## 2018-10-08 ENCOUNTER — HOSPITAL ENCOUNTER (OUTPATIENT)
Facility: MEDICAL CENTER | Age: 43
End: 2018-10-08
Attending: ORTHOPAEDIC SURGERY | Admitting: ORTHOPAEDIC SURGERY
Payer: COMMERCIAL

## 2018-10-11 ENCOUNTER — HOSPITAL ENCOUNTER (OUTPATIENT)
Dept: HOSPITAL 8 - STAR | Age: 43
Discharge: HOME | End: 2018-10-11
Attending: ORTHOPAEDIC SURGERY
Payer: COMMERCIAL

## 2018-10-11 DIAGNOSIS — Z01.818: Primary | ICD-10-CM

## 2018-10-11 DIAGNOSIS — M43.16: ICD-10-CM

## 2018-10-11 DIAGNOSIS — Z87.891: ICD-10-CM

## 2018-10-11 DIAGNOSIS — I10: ICD-10-CM

## 2018-10-11 LAB
ALBUMIN SERPL-MCNC: 4 G/DL (ref 3.4–5)
ALP SERPL-CCNC: 97 U/L (ref 45–117)
ALT SERPL-CCNC: 18 U/L (ref 12–78)
ANION GAP SERPL CALC-SCNC: 9 MMOL/L (ref 5–15)
APTT BLD: 28 SECONDS (ref 25–31)
BASOPHILS # BLD AUTO: 0.05 X10^3/UL (ref 0–0.1)
BASOPHILS NFR BLD AUTO: 1 % (ref 0–1)
BILIRUB SERPL-MCNC: 0.4 MG/DL (ref 0.2–1)
CALCIUM SERPL-MCNC: 9.4 MG/DL (ref 8.5–10.1)
CHLORIDE SERPL-SCNC: 105 MMOL/L (ref 98–107)
CREAT SERPL-MCNC: 1.09 MG/DL (ref 0.7–1.3)
CULTURE INDICATED?: NO
EOSINOPHIL # BLD AUTO: 0.25 X10^3/UL (ref 0–0.4)
EOSINOPHIL NFR BLD AUTO: 2 % (ref 1–7)
ERYTHROCYTE [DISTWIDTH] IN BLOOD BY AUTOMATED COUNT: 13.8 % (ref 9.4–14.8)
ERYTHROCYTE [SEDIMENTATION RATE] IN BLOOD BY WESTERGREN METHOD: 5 MM/HR (ref 0–10)
HCT (SEDRATE): 47.8 % (ref 39.2–51.8)
INR PPP: 1.01 (ref 0.93–1.1)
LYMPHOCYTES # BLD AUTO: 3.36 X10^3/UL (ref 1–3.4)
LYMPHOCYTES NFR BLD AUTO: 32 % (ref 22–44)
MCH RBC QN AUTO: 31 PG (ref 27.5–34.5)
MCHC RBC AUTO-ENTMCNC: 33.6 G/DL (ref 33.2–36.2)
MCV RBC AUTO: 92.2 FL (ref 81–97)
MD: NO
MICROSCOPIC: (no result)
MONOCYTES # BLD AUTO: 0.67 X10^3/UL (ref 0.2–0.8)
MONOCYTES NFR BLD AUTO: 6 % (ref 2–9)
NEUTROPHILS # BLD AUTO: 6.31 X10^3/UL (ref 1.8–6.8)
NEUTROPHILS NFR BLD AUTO: 59 % (ref 42–75)
PLATELET # BLD AUTO: 273 X10^3/UL (ref 130–400)
PMV BLD AUTO: 9 FL (ref 7.4–10.4)
PROT SERPL-MCNC: 7.3 G/DL (ref 6.4–8.2)
PROTHROMBIN TIME: 10.4 SECONDS (ref 9.6–11.5)
RBC # BLD AUTO: 5.18 X10^6/UL (ref 4.38–5.82)

## 2018-10-11 PROCEDURE — 93005 ELECTROCARDIOGRAM TRACING: CPT

## 2018-10-11 PROCEDURE — 85610 PROTHROMBIN TIME: CPT

## 2018-10-11 PROCEDURE — 87806 HIV AG W/HIV1&2 ANTB W/OPTIC: CPT

## 2018-10-11 PROCEDURE — 85730 THROMBOPLASTIN TIME PARTIAL: CPT

## 2018-10-11 PROCEDURE — 81003 URINALYSIS AUTO W/O SCOPE: CPT

## 2018-10-11 PROCEDURE — 80053 COMPREHEN METABOLIC PANEL: CPT

## 2018-10-11 PROCEDURE — 36415 COLL VENOUS BLD VENIPUNCTURE: CPT

## 2018-10-11 PROCEDURE — 85025 COMPLETE CBC W/AUTO DIFF WBC: CPT

## 2018-10-11 PROCEDURE — G0475 HIV COMBINATION ASSAY: HCPCS

## 2018-10-11 PROCEDURE — 71046 X-RAY EXAM CHEST 2 VIEWS: CPT

## 2018-10-11 PROCEDURE — 80074 ACUTE HEPATITIS PANEL: CPT

## 2018-10-11 PROCEDURE — 85651 RBC SED RATE NONAUTOMATED: CPT

## 2018-10-16 ENCOUNTER — HOSPITAL ENCOUNTER (INPATIENT)
Dept: HOSPITAL 8 - ORIP | Age: 43
LOS: 2 days | Discharge: HOME | DRG: 460 | End: 2018-10-18
Attending: ORTHOPAEDIC SURGERY | Admitting: ORTHOPAEDIC SURGERY
Payer: COMMERCIAL

## 2018-10-16 VITALS — BODY MASS INDEX: 32.9 KG/M2 | HEIGHT: 71 IN | WEIGHT: 235.01 LBS

## 2018-10-16 VITALS — SYSTOLIC BLOOD PRESSURE: 115 MMHG | DIASTOLIC BLOOD PRESSURE: 73 MMHG

## 2018-10-16 VITALS — DIASTOLIC BLOOD PRESSURE: 60 MMHG | SYSTOLIC BLOOD PRESSURE: 102 MMHG

## 2018-10-16 VITALS — SYSTOLIC BLOOD PRESSURE: 117 MMHG | DIASTOLIC BLOOD PRESSURE: 73 MMHG

## 2018-10-16 DIAGNOSIS — Z82.49: ICD-10-CM

## 2018-10-16 DIAGNOSIS — Z79.899: ICD-10-CM

## 2018-10-16 DIAGNOSIS — M41.86: ICD-10-CM

## 2018-10-16 DIAGNOSIS — Z80.42: ICD-10-CM

## 2018-10-16 DIAGNOSIS — M51.16: Primary | ICD-10-CM

## 2018-10-16 DIAGNOSIS — Z87.891: ICD-10-CM

## 2018-10-16 DIAGNOSIS — Z90.49: ICD-10-CM

## 2018-10-16 DIAGNOSIS — M48.061: ICD-10-CM

## 2018-10-16 DIAGNOSIS — G89.29: ICD-10-CM

## 2018-10-16 DIAGNOSIS — I10: ICD-10-CM

## 2018-10-16 DIAGNOSIS — G47.00: ICD-10-CM

## 2018-10-16 DIAGNOSIS — Z82.5: ICD-10-CM

## 2018-10-16 DIAGNOSIS — M25.511: ICD-10-CM

## 2018-10-16 DIAGNOSIS — Z80.6: ICD-10-CM

## 2018-10-16 DIAGNOSIS — M43.16: ICD-10-CM

## 2018-10-16 DIAGNOSIS — D64.9: ICD-10-CM

## 2018-10-16 DIAGNOSIS — M53.2X6: ICD-10-CM

## 2018-10-16 PROCEDURE — 0QB20ZZ EXCISION OF RIGHT PELVIC BONE, OPEN APPROACH: ICD-10-PCS | Performed by: ORTHOPAEDIC SURGERY

## 2018-10-16 PROCEDURE — 86850 RBC ANTIBODY SCREEN: CPT

## 2018-10-16 PROCEDURE — 00HU33Z INSERTION OF INFUSION DEVICE INTO SPINAL CANAL, PERCUTANEOUS APPROACH: ICD-10-PCS | Performed by: ORTHOPAEDIC SURGERY

## 2018-10-16 PROCEDURE — C1713 ANCHOR/SCREW BN/BN,TIS/BN: HCPCS

## 2018-10-16 PROCEDURE — 0SG3071 FUSION OF LUMBOSACRAL JOINT WITH AUTOLOGOUS TISSUE SUBSTITUTE, POSTERIOR APPROACH, POSTERIOR COLUMN, OPEN APPROACH: ICD-10-PCS | Performed by: ORTHOPAEDIC SURGERY

## 2018-10-16 PROCEDURE — 36415 COLL VENOUS BLD VENIPUNCTURE: CPT

## 2018-10-16 PROCEDURE — 72100 X-RAY EXAM L-S SPINE 2/3 VWS: CPT

## 2018-10-16 PROCEDURE — 86923 COMPATIBILITY TEST ELECTRIC: CPT

## 2018-10-16 PROCEDURE — 85025 COMPLETE CBC W/AUTO DIFF WBC: CPT

## 2018-10-16 PROCEDURE — C1762 CONN TISS, HUMAN(INC FASCIA): HCPCS

## 2018-10-16 PROCEDURE — 0SB20ZZ EXCISION OF LUMBAR VERTEBRAL DISC, OPEN APPROACH: ICD-10-PCS | Performed by: ORTHOPAEDIC SURGERY

## 2018-10-16 PROCEDURE — C1751 CATH, INF, PER/CENT/MIDLINE: HCPCS

## 2018-10-16 PROCEDURE — 80048 BASIC METABOLIC PNL TOTAL CA: CPT

## 2018-10-16 PROCEDURE — 86900 BLOOD TYPING SEROLOGIC ABO: CPT

## 2018-10-16 PROCEDURE — 0SG1071 FUSION OF 2 OR MORE LUMBAR VERTEBRAL JOINTS WITH AUTOLOGOUS TISSUE SUBSTITUTE, POSTERIOR APPROACH, POSTERIOR COLUMN, OPEN APPROACH: ICD-10-PCS | Performed by: ORTHOPAEDIC SURGERY

## 2018-10-16 PROCEDURE — 3E0R3BZ INTRODUCTION OF ANESTHETIC AGENT INTO SPINAL CANAL, PERCUTANEOUS APPROACH: ICD-10-PCS | Performed by: ORTHOPAEDIC SURGERY

## 2018-10-16 PROCEDURE — 4A11X4G MONITORING OF PERIPHERAL NERVOUS ELECTRICAL ACTIVITY, INTRAOPERATIVE, EXTERNAL APPROACH: ICD-10-PCS | Performed by: ORTHOPAEDIC SURGERY

## 2018-10-16 RX ADMIN — FLUTICASONE PROPIONATE SCH SPRAY: 50 SPRAY, METERED NASAL at 20:50

## 2018-10-16 RX ADMIN — MEPERIDINE HYDROCHLORIDE PRN MG: 25 INJECTION, SOLUTION INTRAMUSCULAR; INTRAVENOUS; SUBCUTANEOUS at 15:45

## 2018-10-16 RX ADMIN — HYDROMORPHONE HYDROCHLORIDE PRN MG: 1 INJECTION, SOLUTION INTRAMUSCULAR; INTRAVENOUS; SUBCUTANEOUS at 14:54

## 2018-10-16 RX ADMIN — HYDROMORPHONE HYDROCHLORIDE PRN MG: 1 INJECTION, SOLUTION INTRAMUSCULAR; INTRAVENOUS; SUBCUTANEOUS at 15:14

## 2018-10-16 RX ADMIN — HYDROMORPHONE HYDROCHLORIDE PRN MG: 1 INJECTION, SOLUTION INTRAMUSCULAR; INTRAVENOUS; SUBCUTANEOUS at 15:20

## 2018-10-16 RX ADMIN — DIAZEPAM PRN MG: 5 INJECTION, SOLUTION INTRAMUSCULAR; INTRAVENOUS at 16:00

## 2018-10-16 RX ADMIN — OXYCODONE HYDROCHLORIDE AND ACETAMINOPHEN PRN TAB: 5; 325 TABLET ORAL at 23:56

## 2018-10-16 RX ADMIN — LOSARTAN POTASSIUM SCH MG: 50 TABLET, FILM COATED ORAL at 20:50

## 2018-10-16 RX ADMIN — HYDROMORPHONE HYDROCHLORIDE PRN MG: 1 INJECTION, SOLUTION INTRAMUSCULAR; INTRAVENOUS; SUBCUTANEOUS at 15:45

## 2018-10-16 RX ADMIN — MEPERIDINE HYDROCHLORIDE PRN MG: 25 INJECTION, SOLUTION INTRAMUSCULAR; INTRAVENOUS; SUBCUTANEOUS at 15:29

## 2018-10-16 RX ADMIN — DEXTROSE, SODIUM CHLORIDE, AND POTASSIUM CHLORIDE SCH MLS/HR: 5; .9; .15 INJECTION INTRAVENOUS at 21:04

## 2018-10-16 RX ADMIN — HYDROMORPHONE HYDROCHLORIDE PRN MG: 1 INJECTION, SOLUTION INTRAMUSCULAR; INTRAVENOUS; SUBCUTANEOUS at 15:00

## 2018-10-16 RX ADMIN — HYDROMORPHONE HYDROCHLORIDE PRN MG: 1 INJECTION, SOLUTION INTRAMUSCULAR; INTRAVENOUS; SUBCUTANEOUS at 15:52

## 2018-10-16 RX ADMIN — CEFAZOLIN SODIUM SCH MLS/HR: 1 SOLUTION INTRAVENOUS at 20:11

## 2018-10-16 RX ADMIN — DIAZEPAM PRN MG: 5 INJECTION, SOLUTION INTRAMUSCULAR; INTRAVENOUS at 15:02

## 2018-10-16 RX ADMIN — INSULIN LISPRO SCH NOTE: 100 INJECTION, SOLUTION INTRAVENOUS; SUBCUTANEOUS at 19:30

## 2018-10-16 RX ADMIN — GABAPENTIN SCH MG: 300 CAPSULE ORAL at 20:49

## 2018-10-17 VITALS — DIASTOLIC BLOOD PRESSURE: 73 MMHG | SYSTOLIC BLOOD PRESSURE: 117 MMHG

## 2018-10-17 VITALS — SYSTOLIC BLOOD PRESSURE: 121 MMHG | DIASTOLIC BLOOD PRESSURE: 69 MMHG

## 2018-10-17 VITALS — SYSTOLIC BLOOD PRESSURE: 112 MMHG | DIASTOLIC BLOOD PRESSURE: 72 MMHG

## 2018-10-17 VITALS — DIASTOLIC BLOOD PRESSURE: 82 MMHG | SYSTOLIC BLOOD PRESSURE: 139 MMHG

## 2018-10-17 LAB
<PLATELET ESTIMATE>: ADEQUATE
<PLT MORPHOLOGY>: (no result)
ANION GAP SERPL CALC-SCNC: 4 MMOL/L (ref 5–15)
BAND#(MANUAL): 0.64 X10^3/UL
BILIRUB DIRECT SERPL-MCNC: NORMAL MG/DL
CALCIUM SERPL-MCNC: 8 MG/DL (ref 8.5–10.1)
CHLORIDE SERPL-SCNC: 111 MMOL/L (ref 98–107)
CREAT SERPL-MCNC: 1.02 MG/DL (ref 0.7–1.3)
ERYTHROCYTE [DISTWIDTH] IN BLOOD BY AUTOMATED COUNT: 13.5 % (ref 9.4–14.8)
LYMPH#(MANUAL): 2.14 X10^3/UL (ref 1–3.4)
LYMPHS% (MANUAL): 10 % (ref 22–44)
MCH RBC QN AUTO: 31.8 PG (ref 27.5–34.5)
MCHC RBC AUTO-ENTMCNC: 33.9 G/DL (ref 33.2–36.2)
MCV RBC AUTO: 93.8 FL (ref 81–97)
MD: YES
MONOS#(MANUAL): 1.07 X10^3/UL (ref 0.3–2.7)
MONOS% (MANUAL): 5 % (ref 2–9)
NEUTS BAND NFR BLD: 3 % (ref 0–7)
PLATELET # BLD AUTO: 255 X10^3/UL (ref 130–400)
PMV BLD AUTO: 9.1 FL (ref 7.4–10.4)
RBC # BLD AUTO: 4.17 X10^6/UL (ref 4.38–5.82)
SEG#(MANUAL): 17.55 X10^3/UL (ref 1.8–6.8)
SEGS% (MANUAL): 82 % (ref 42–75)

## 2018-10-17 RX ADMIN — OXYCODONE HYDROCHLORIDE AND ACETAMINOPHEN PRN TAB: 5; 325 TABLET ORAL at 09:27

## 2018-10-17 RX ADMIN — OXYCODONE HYDROCHLORIDE AND ACETAMINOPHEN PRN TAB: 5; 325 TABLET ORAL at 22:20

## 2018-10-17 RX ADMIN — LOSARTAN POTASSIUM SCH MG: 50 TABLET, FILM COATED ORAL at 08:31

## 2018-10-17 RX ADMIN — DEXTROSE, SODIUM CHLORIDE, AND POTASSIUM CHLORIDE SCH MLS/HR: 5; .9; .15 INJECTION INTRAVENOUS at 20:00

## 2018-10-17 RX ADMIN — DULOXETINE HYDROCHLORIDE SCH MG: 30 CAPSULE, DELAYED RELEASE ORAL at 08:23

## 2018-10-17 RX ADMIN — GABAPENTIN SCH MG: 300 CAPSULE ORAL at 21:52

## 2018-10-17 RX ADMIN — DEXTROSE, SODIUM CHLORIDE, AND POTASSIUM CHLORIDE SCH MLS/HR: 5; .9; .15 INJECTION INTRAVENOUS at 03:40

## 2018-10-17 RX ADMIN — FLUTICASONE PROPIONATE SCH SPRAY: 50 SPRAY, METERED NASAL at 21:53

## 2018-10-17 RX ADMIN — GABAPENTIN SCH MG: 300 CAPSULE ORAL at 08:22

## 2018-10-17 RX ADMIN — CEFAZOLIN SODIUM SCH MLS/HR: 1 SOLUTION INTRAVENOUS at 11:02

## 2018-10-17 RX ADMIN — CEFAZOLIN SODIUM SCH MLS/HR: 1 SOLUTION INTRAVENOUS at 03:40

## 2018-10-17 RX ADMIN — CEFAZOLIN SODIUM SCH MLS/HR: 1 SOLUTION INTRAVENOUS at 18:39

## 2018-10-17 RX ADMIN — LOSARTAN POTASSIUM SCH MG: 50 TABLET, FILM COATED ORAL at 08:23

## 2018-10-17 RX ADMIN — OXYCODONE HYDROCHLORIDE AND ACETAMINOPHEN PRN TAB: 5; 325 TABLET ORAL at 04:58

## 2018-10-17 RX ADMIN — INSULIN LISPRO SCH NOTE: 100 INJECTION, SOLUTION INTRAVENOUS; SUBCUTANEOUS at 19:30

## 2018-10-17 RX ADMIN — DEXTROSE, SODIUM CHLORIDE, AND POTASSIUM CHLORIDE SCH MLS/HR: 5; .9; .15 INJECTION INTRAVENOUS at 13:56

## 2018-10-17 RX ADMIN — INSULIN LISPRO SCH NOTE: 100 INJECTION, SOLUTION INTRAVENOUS; SUBCUTANEOUS at 03:30

## 2018-10-17 RX ADMIN — INSULIN LISPRO SCH NOTE: 100 INJECTION, SOLUTION INTRAVENOUS; SUBCUTANEOUS at 11:30

## 2018-10-17 RX ADMIN — DOCUSATE SODIUM 50MG AND SENNOSIDES 8.6MG SCH TAB: 8.6; 5 TABLET, FILM COATED ORAL at 08:23

## 2018-10-17 RX ADMIN — FLUTICASONE PROPIONATE SCH SPRAY: 50 SPRAY, METERED NASAL at 14:45

## 2018-10-17 RX ADMIN — Medication SCH MG: at 08:22

## 2018-10-17 RX ADMIN — OXYCODONE HYDROCHLORIDE AND ACETAMINOPHEN PRN TAB: 5; 325 TABLET ORAL at 18:35

## 2018-10-17 RX ADMIN — OXYCODONE HYDROCHLORIDE AND ACETAMINOPHEN PRN TAB: 5; 325 TABLET ORAL at 13:55

## 2018-10-18 VITALS — SYSTOLIC BLOOD PRESSURE: 115 MMHG | DIASTOLIC BLOOD PRESSURE: 71 MMHG

## 2018-10-18 VITALS — SYSTOLIC BLOOD PRESSURE: 111 MMHG | DIASTOLIC BLOOD PRESSURE: 67 MMHG

## 2018-10-18 VITALS — DIASTOLIC BLOOD PRESSURE: 80 MMHG | SYSTOLIC BLOOD PRESSURE: 114 MMHG

## 2018-10-18 VITALS — SYSTOLIC BLOOD PRESSURE: 135 MMHG | DIASTOLIC BLOOD PRESSURE: 89 MMHG

## 2018-10-18 LAB
BASOPHILS # BLD AUTO: 0.05 X10^3/UL (ref 0–0.1)
BASOPHILS NFR BLD AUTO: 1 % (ref 0–1)
EOSINOPHIL # BLD AUTO: 0.11 X10^3/UL (ref 0–0.4)
EOSINOPHIL NFR BLD AUTO: 1 % (ref 1–7)
ERYTHROCYTE [DISTWIDTH] IN BLOOD BY AUTOMATED COUNT: 13.8 % (ref 9.4–14.8)
LYMPHOCYTES # BLD AUTO: 2.88 X10^3/UL (ref 1–3.4)
LYMPHOCYTES NFR BLD AUTO: 26 % (ref 22–44)
MCH RBC QN AUTO: 31.3 PG (ref 27.5–34.5)
MCHC RBC AUTO-ENTMCNC: 33.5 G/DL (ref 33.2–36.2)
MCV RBC AUTO: 93.2 FL (ref 81–97)
MD: NO
MONOCYTES # BLD AUTO: 0.99 X10^3/UL (ref 0.2–0.8)
MONOCYTES NFR BLD AUTO: 9 % (ref 2–9)
NEUTROPHILS # BLD AUTO: 6.93 X10^3/UL (ref 1.8–6.8)
NEUTROPHILS NFR BLD AUTO: 63 % (ref 42–75)
PLATELET # BLD AUTO: 208 X10^3/UL (ref 130–400)
PMV BLD AUTO: 9.3 FL (ref 7.4–10.4)
RBC # BLD AUTO: 3.63 X10^6/UL (ref 4.38–5.82)

## 2018-10-18 RX ADMIN — CEFAZOLIN SODIUM SCH MLS/HR: 1 SOLUTION INTRAVENOUS at 03:48

## 2018-10-18 RX ADMIN — DULOXETINE HYDROCHLORIDE SCH MG: 30 CAPSULE, DELAYED RELEASE ORAL at 08:48

## 2018-10-18 RX ADMIN — LOSARTAN POTASSIUM SCH MG: 50 TABLET, FILM COATED ORAL at 08:51

## 2018-10-18 RX ADMIN — DEXTROSE, SODIUM CHLORIDE, AND POTASSIUM CHLORIDE SCH MLS/HR: 5; .9; .15 INJECTION INTRAVENOUS at 09:20

## 2018-10-18 RX ADMIN — GABAPENTIN SCH MG: 300 CAPSULE ORAL at 08:48

## 2018-10-18 RX ADMIN — OXYCODONE HYDROCHLORIDE AND ACETAMINOPHEN PRN TAB: 5; 325 TABLET ORAL at 05:58

## 2018-10-18 RX ADMIN — Medication SCH MG: at 08:48

## 2018-10-18 RX ADMIN — OXYCODONE HYDROCHLORIDE AND ACETAMINOPHEN PRN TAB: 5; 325 TABLET ORAL at 10:13

## 2018-10-18 RX ADMIN — FLUTICASONE PROPIONATE SCH SPRAY: 50 SPRAY, METERED NASAL at 08:51

## 2018-10-18 RX ADMIN — CEFAZOLIN SODIUM SCH MLS/HR: 1 SOLUTION INTRAVENOUS at 11:15

## 2018-10-18 RX ADMIN — OXYCODONE HYDROCHLORIDE AND ACETAMINOPHEN PRN TAB: 5; 325 TABLET ORAL at 14:02

## 2018-10-18 RX ADMIN — DEXTROSE, SODIUM CHLORIDE, AND POTASSIUM CHLORIDE SCH MLS/HR: 5; .9; .15 INJECTION INTRAVENOUS at 02:40

## 2018-10-18 RX ADMIN — DOCUSATE SODIUM 50MG AND SENNOSIDES 8.6MG SCH TAB: 8.6; 5 TABLET, FILM COATED ORAL at 08:48

## 2018-10-23 ENCOUNTER — APPOINTMENT (OUTPATIENT)
Dept: MEDICAL GROUP | Facility: PHYSICIAN GROUP | Age: 43
End: 2018-10-23
Payer: COMMERCIAL

## 2018-11-04 ENCOUNTER — PATIENT MESSAGE (OUTPATIENT)
Dept: MEDICAL GROUP | Facility: PHYSICIAN GROUP | Age: 43
End: 2018-11-04

## 2018-11-05 RX ORDER — DULOXETIN HYDROCHLORIDE 60 MG/1
60 CAPSULE, DELAYED RELEASE ORAL DAILY
Qty: 30 CAP | Refills: 0 | Status: SHIPPED | OUTPATIENT
Start: 2018-11-05 | End: 2018-12-03

## 2018-11-05 RX ORDER — GABAPENTIN 300 MG/1
300 CAPSULE ORAL 3 TIMES DAILY
Qty: 90 CAP | Refills: 0 | Status: SHIPPED | OUTPATIENT
Start: 2018-11-05 | End: 2018-12-03

## 2018-11-05 NOTE — TELEPHONE ENCOUNTER
Was the patient seen in the last year in this department? Yes    Does patient have an active prescription for medications requested?NO    Received Request Via: Pharmacy

## 2018-12-01 ENCOUNTER — OFFICE VISIT (OUTPATIENT)
Dept: URGENT CARE | Facility: CLINIC | Age: 43
End: 2018-12-01
Payer: COMMERCIAL

## 2018-12-01 ENCOUNTER — HOSPITAL ENCOUNTER (OUTPATIENT)
Dept: HOSPITAL 8 - ED | Age: 43
Setting detail: OBSERVATION
LOS: 1 days | Discharge: HOME | End: 2018-12-02
Attending: INTERNAL MEDICINE | Admitting: INTERNAL MEDICINE
Payer: COMMERCIAL

## 2018-12-01 VITALS
WEIGHT: 218 LBS | HEIGHT: 71 IN | BODY MASS INDEX: 30.52 KG/M2 | TEMPERATURE: 98.3 F | SYSTOLIC BLOOD PRESSURE: 142 MMHG | DIASTOLIC BLOOD PRESSURE: 90 MMHG | RESPIRATION RATE: 16 BRPM | HEART RATE: 74 BPM | OXYGEN SATURATION: 95 %

## 2018-12-01 VITALS — BODY MASS INDEX: 29.01 KG/M2 | WEIGHT: 207.23 LBS | HEIGHT: 71 IN

## 2018-12-01 VITALS — SYSTOLIC BLOOD PRESSURE: 122 MMHG | DIASTOLIC BLOOD PRESSURE: 78 MMHG

## 2018-12-01 VITALS — DIASTOLIC BLOOD PRESSURE: 68 MMHG | SYSTOLIC BLOOD PRESSURE: 108 MMHG

## 2018-12-01 DIAGNOSIS — Z23: ICD-10-CM

## 2018-12-01 DIAGNOSIS — F41.9: ICD-10-CM

## 2018-12-01 DIAGNOSIS — I11.9: ICD-10-CM

## 2018-12-01 DIAGNOSIS — R07.9 CHEST PAIN, UNSPECIFIED TYPE: ICD-10-CM

## 2018-12-01 DIAGNOSIS — F17.200: ICD-10-CM

## 2018-12-01 DIAGNOSIS — F32.9: ICD-10-CM

## 2018-12-01 DIAGNOSIS — G89.29: ICD-10-CM

## 2018-12-01 DIAGNOSIS — R07.9: Primary | ICD-10-CM

## 2018-12-01 DIAGNOSIS — M51.26: ICD-10-CM

## 2018-12-01 DIAGNOSIS — Z82.49: ICD-10-CM

## 2018-12-01 DIAGNOSIS — D72.829: ICD-10-CM

## 2018-12-01 LAB
ALBUMIN SERPL-MCNC: 4.2 G/DL (ref 3.4–5)
ALP SERPL-CCNC: 134 U/L (ref 45–117)
ALT SERPL-CCNC: 23 U/L (ref 12–78)
ANION GAP SERPL CALC-SCNC: 9 MMOL/L (ref 5–15)
BASOPHILS # BLD AUTO: 0.04 X10^3/UL (ref 0–0.1)
BASOPHILS NFR BLD AUTO: 0 % (ref 0–1)
BILIRUB SERPL-MCNC: 0.5 MG/DL (ref 0.2–1)
CALCIUM SERPL-MCNC: 9.6 MG/DL (ref 8.5–10.1)
CHLORIDE SERPL-SCNC: 108 MMOL/L (ref 98–107)
CREAT SERPL-MCNC: 1.12 MG/DL (ref 0.7–1.3)
EOSINOPHIL # BLD AUTO: 0.17 X10^3/UL (ref 0–0.4)
EOSINOPHIL NFR BLD AUTO: 1 % (ref 1–7)
ERYTHROCYTE [DISTWIDTH] IN BLOOD BY AUTOMATED COUNT: 13.8 % (ref 9.4–14.8)
LYMPHOCYTES # BLD AUTO: 2.48 X10^3/UL (ref 1–3.4)
LYMPHOCYTES NFR BLD AUTO: 20 % (ref 22–44)
MCH RBC QN AUTO: 30.4 PG (ref 27.5–34.5)
MCHC RBC AUTO-ENTMCNC: 33.8 G/DL (ref 33.2–36.2)
MCV RBC AUTO: 90 FL (ref 81–97)
MD: NO
MONOCYTES # BLD AUTO: 0.78 X10^3/UL (ref 0.2–0.8)
MONOCYTES NFR BLD AUTO: 6 % (ref 2–9)
NEUTROPHILS # BLD AUTO: 8.84 X10^3/UL (ref 1.8–6.8)
NEUTROPHILS NFR BLD AUTO: 72 % (ref 42–75)
PLATELET # BLD AUTO: 329 X10^3/UL (ref 130–400)
PMV BLD AUTO: 9.3 FL (ref 7.4–10.4)
PROT SERPL-MCNC: 7.4 G/DL (ref 6.4–8.2)
RBC # BLD AUTO: 4.88 X10^6/UL (ref 4.38–5.82)
TROPONIN I SERPL-MCNC: < 0.015 NG/ML (ref 0–0.04)
TROPONIN I SERPL-MCNC: < 0.015 NG/ML (ref 0–0.04)

## 2018-12-01 PROCEDURE — 96372 THER/PROPH/DIAG INJ SC/IM: CPT

## 2018-12-01 PROCEDURE — 99284 EMERGENCY DEPT VISIT MOD MDM: CPT

## 2018-12-01 PROCEDURE — 90471 IMMUNIZATION ADMIN: CPT

## 2018-12-01 PROCEDURE — C9898 INPNT STAY RADIOLABELED ITEM: HCPCS

## 2018-12-01 PROCEDURE — 93005 ELECTROCARDIOGRAM TRACING: CPT

## 2018-12-01 PROCEDURE — A9502 TC99M TETROFOSMIN: HCPCS

## 2018-12-01 PROCEDURE — 96374 THER/PROPH/DIAG INJ IV PUSH: CPT

## 2018-12-01 PROCEDURE — 84443 ASSAY THYROID STIM HORMONE: CPT

## 2018-12-01 PROCEDURE — 85379 FIBRIN DEGRADATION QUANT: CPT

## 2018-12-01 PROCEDURE — 85025 COMPLETE CBC W/AUTO DIFF WBC: CPT

## 2018-12-01 PROCEDURE — 80061 LIPID PANEL: CPT

## 2018-12-01 PROCEDURE — 78452 HT MUSCLE IMAGE SPECT MULT: CPT

## 2018-12-01 PROCEDURE — 71045 X-RAY EXAM CHEST 1 VIEW: CPT

## 2018-12-01 PROCEDURE — G0378 HOSPITAL OBSERVATION PER HR: HCPCS

## 2018-12-01 PROCEDURE — 84484 ASSAY OF TROPONIN QUANT: CPT

## 2018-12-01 PROCEDURE — 96376 TX/PRO/DX INJ SAME DRUG ADON: CPT

## 2018-12-01 PROCEDURE — 36415 COLL VENOUS BLD VENIPUNCTURE: CPT

## 2018-12-01 PROCEDURE — 96375 TX/PRO/DX INJ NEW DRUG ADDON: CPT

## 2018-12-01 PROCEDURE — 93017 CV STRESS TEST TRACING ONLY: CPT

## 2018-12-01 PROCEDURE — 80053 COMPREHEN METABOLIC PANEL: CPT

## 2018-12-01 PROCEDURE — 71275 CT ANGIOGRAPHY CHEST: CPT

## 2018-12-01 PROCEDURE — 90656 IIV3 VACC NO PRSV 0.5 ML IM: CPT

## 2018-12-01 PROCEDURE — 99214 OFFICE O/P EST MOD 30 MIN: CPT | Performed by: PHYSICIAN ASSISTANT

## 2018-12-01 RX ADMIN — KETOROLAC TROMETHAMINE PRN MG: 30 INJECTION, SOLUTION INTRAMUSCULAR at 22:57

## 2018-12-01 RX ADMIN — MORPHINE SULFATE PRN MG: 4 INJECTION INTRAVENOUS at 13:40

## 2018-12-01 RX ADMIN — OXYCODONE HYDROCHLORIDE PRN MG: 5 TABLET ORAL at 23:03

## 2018-12-01 RX ADMIN — KETOROLAC TROMETHAMINE PRN MG: 30 INJECTION, SOLUTION INTRAMUSCULAR at 16:02

## 2018-12-01 RX ADMIN — OXYCODONE HYDROCHLORIDE PRN MG: 5 TABLET ORAL at 17:18

## 2018-12-01 RX ADMIN — NITROGLYCERIN PRN MG: 0.4 TABLET SUBLINGUAL at 14:40

## 2018-12-01 RX ADMIN — NITROGLYCERIN PRN MG: 0.4 TABLET SUBLINGUAL at 14:28

## 2018-12-01 RX ADMIN — MORPHINE SULFATE PRN MG: 4 INJECTION INTRAVENOUS at 14:29

## 2018-12-01 ASSESSMENT — ENCOUNTER SYMPTOMS
DIAPHORESIS: 0
PALPITATIONS: 0
IRREGULAR HEARTBEAT: 0
CONSTITUTIONAL NEGATIVE: 1
NEUROLOGICAL NEGATIVE: 1
RESPIRATORY NEGATIVE: 1

## 2018-12-01 NOTE — PROGRESS NOTES
"Subjective:      Leo Manuel is a 43 y.o. male who presents with Chest Pain (chest, back, shoulder blade pain x 1 hour)            Chest Pain    This is a new (1hr lower CP, some rad; ) problem. The current episode started today. The onset quality is sudden. The problem occurs constantly. The problem has been unchanged. The pain is present in the substernal region. The pain is severe. The quality of the pain is described as dull and heavy. The pain radiates to the left shoulder. Pertinent negatives include no diaphoresis, irregular heartbeat or palpitations. The pain is aggravated by nothing. He has tried nothing for the symptoms. The treatment provided no relief. There are no known risk factors.   His family medical history is significant for CAD.       Review of Systems   Constitutional: Negative.  Negative for diaphoresis.   HENT: Negative.    Respiratory: Negative.    Cardiovascular: Positive for chest pain. Negative for palpitations and leg swelling.   Skin: Negative.    Neurological: Negative.           Objective:     /90 (BP Location: Right arm, Patient Position: Sitting, BP Cuff Size: Adult)   Pulse 74   Temp 36.8 °C (98.3 °F) (Temporal)   Resp 16   Ht 1.803 m (5' 10.98\")   Wt 98.9 kg (218 lb)   SpO2 95%   BMI 30.42 kg/m²      Physical Exam   Constitutional: He is oriented to person, place, and time. He appears well-developed and well-nourished. He appears distressed.   Cardiovascular: Normal rate, regular rhythm and normal heart sounds.    Pulmonary/Chest: Effort normal and breath sounds normal. No respiratory distress.   Neurological: He is alert and oriented to person, place, and time. He exhibits normal muscle tone. Coordination normal.   Skin: Skin is warm and dry.   Psychiatric: He has a normal mood and affect. His behavior is normal.   Nursing note and vitals reviewed.    Active Ambulatory Problems     Diagnosis Date Noted   • Hypertension 08/19/2016   • Bursitis of right shoulder " 08/19/2016   • Right shoulder pain 11/29/2016   • Primary insomnia 03/01/2017   • Obesity (BMI 30-39.9) 03/01/2017   • Environmental allergies 06/07/2017   • Right-sided low back pain with sciatica 06/07/2017   • ED (erectile dysfunction) 06/07/2017   • Nicotine addiction 10/18/2017   • Chronic pain of right knee 10/18/2017   • Other chronic pain 10/18/2017   • Bronchitis 08/31/2018   • Changing nevus 08/31/2018   • Chronic use of opiate drugs therapeutic purposes 09/25/2018     Resolved Ambulatory Problems     Diagnosis Date Noted   • No Resolved Ambulatory Problems     Past Medical History:   Diagnosis Date   • Elevated cholesterol    • Heart burn    • Hypertension    • Pain      Current Outpatient Prescriptions on File Prior to Visit   Medication Sig Dispense Refill   • gabapentin (NEURONTIN) 300 MG Cap Take 1 Cap by mouth 3 times a day. 90 Cap 0   • DULoxetine (CYMBALTA) 60 MG Cap DR Particles delayed-release capsule Take 1 Cap by mouth every day. 30 Cap 0   • traZODone (DESYREL) 100 MG Tab Take 1 Tab by mouth at bedtime as needed for Sleep. 90 Tab 3   • losartan-hydrochlorothiazide (HYZAAR) 100-12.5 MG per tablet Take 1 Tab by mouth every day. 90 Tab 3   • albuterol 108 (90 Base) MCG/ACT Aero Soln inhalation aerosol Inhale 2 Puffs by mouth every 6 hours as needed. 8.5 g 3   • tadalafil (CIALIS) 10 MG tablet Take 1 Tab by mouth as needed for Erectile Dysfunction. 60 Tab 5   • Calcium Carbonate Antacid (TUMS PO) Take  by mouth as needed.     • tizanidine (ZANAFLEX) 4 MG Tab Take 4 mg by mouth at bedtime as needed.       No current facility-administered medications on file prior to visit.      Social History     Social History   • Marital status:      Spouse name: N/A   • Number of children: N/A   • Years of education: N/A     Occupational History   •       Social History Main Topics   • Smoking status: Current Every Day Smoker     Packs/day: 0.25     Years: 6.00     Types: Cigarettes    • Smokeless tobacco: Never Used   • Alcohol use No   • Drug use: No      Comment: methamphetamine x 2 years stopped 2012   • Sexual activity: Yes     Partners: Female     Other Topics Concern   • Not on file     Social History Narrative   • No narrative on file     Family History   Problem Relation Age of Onset   • Lung Disease Mother         COPD   • Arthritis Mother         RA   • Cancer Father         leukemia, prostate cancer   • No Known Problems Brother    • No Known Problems Brother      Shellfish allergy     urg course  O2, ntg, asa 325chew  Ems called    Ekg= inverted t's;     Assessment/Plan:     ·  1hr CP      · EMS arrived; pt xport to ER

## 2018-12-02 VITALS — DIASTOLIC BLOOD PRESSURE: 70 MMHG | SYSTOLIC BLOOD PRESSURE: 107 MMHG

## 2018-12-02 VITALS — SYSTOLIC BLOOD PRESSURE: 101 MMHG | DIASTOLIC BLOOD PRESSURE: 69 MMHG

## 2018-12-02 VITALS — SYSTOLIC BLOOD PRESSURE: 109 MMHG | DIASTOLIC BLOOD PRESSURE: 71 MMHG

## 2018-12-02 LAB
BASOPHILS # BLD AUTO: 0.05 X10^3/UL (ref 0–0.1)
BASOPHILS NFR BLD AUTO: 1 % (ref 0–1)
CHOL/HDL RATIO: 4.9
EOSINOPHIL # BLD AUTO: 0.28 X10^3/UL (ref 0–0.4)
EOSINOPHIL NFR BLD AUTO: 3 % (ref 1–7)
ERYTHROCYTE [DISTWIDTH] IN BLOOD BY AUTOMATED COUNT: 14.3 % (ref 9.4–14.8)
HDL CHOL %: 20 % (ref 26–37)
HDL CHOLESTEROL (DIRECT): 35 MG/DL (ref 40–60)
LDL CHOLESTEROL,CALCULATED: 113 MG/DL (ref 54–169)
LDLC/HDLC SERPL: 3.2 {RATIO} (ref 0.5–3)
LYMPHOCYTES # BLD AUTO: 3.13 X10^3/UL (ref 1–3.4)
LYMPHOCYTES NFR BLD AUTO: 35 % (ref 22–44)
MCH RBC QN AUTO: 30.6 PG (ref 27.5–34.5)
MCHC RBC AUTO-ENTMCNC: 33.5 G/DL (ref 33.2–36.2)
MCV RBC AUTO: 91.3 FL (ref 81–97)
MD: NO
MONOCYTES # BLD AUTO: 0.66 X10^3/UL (ref 0.2–0.8)
MONOCYTES NFR BLD AUTO: 8 % (ref 2–9)
NEUTROPHILS # BLD AUTO: 4.73 X10^3/UL (ref 1.8–6.8)
NEUTROPHILS NFR BLD AUTO: 53 % (ref 42–75)
PLATELET # BLD AUTO: 276 X10^3/UL (ref 130–400)
PMV BLD AUTO: 9.2 FL (ref 7.4–10.4)
RBC # BLD AUTO: 4.34 X10^6/UL (ref 4.38–5.82)
TRIGL SERPL-MCNC: 117 MG/DL (ref 50–200)
TROPONIN I SERPL-MCNC: < 0.015 NG/ML (ref 0–0.04)
VLDLC SERPL CALC-MCNC: 23 MG/DL (ref 0–25)

## 2018-12-02 RX ADMIN — OXYCODONE HYDROCHLORIDE PRN MG: 5 TABLET ORAL at 06:12

## 2018-12-02 RX ADMIN — OXYCODONE HYDROCHLORIDE PRN MG: 5 TABLET ORAL at 12:19

## 2018-12-02 RX ADMIN — KETOROLAC TROMETHAMINE PRN MG: 30 INJECTION, SOLUTION INTRAMUSCULAR at 12:19

## 2018-12-02 RX ADMIN — KETOROLAC TROMETHAMINE PRN MG: 30 INJECTION, SOLUTION INTRAMUSCULAR at 06:12

## 2018-12-03 ENCOUNTER — OFFICE VISIT (OUTPATIENT)
Dept: MEDICAL GROUP | Facility: PHYSICIAN GROUP | Age: 43
End: 2018-12-03
Payer: COMMERCIAL

## 2018-12-03 VITALS
RESPIRATION RATE: 16 BRPM | HEART RATE: 60 BPM | TEMPERATURE: 99.3 F | BODY MASS INDEX: 30.24 KG/M2 | OXYGEN SATURATION: 98 % | WEIGHT: 216 LBS | HEIGHT: 71 IN | SYSTOLIC BLOOD PRESSURE: 120 MMHG | DIASTOLIC BLOOD PRESSURE: 88 MMHG

## 2018-12-03 DIAGNOSIS — I10 ESSENTIAL HYPERTENSION: ICD-10-CM

## 2018-12-03 DIAGNOSIS — F51.01 PRIMARY INSOMNIA: ICD-10-CM

## 2018-12-03 DIAGNOSIS — Z98.1 S/P LUMBAR FUSION: ICD-10-CM

## 2018-12-03 DIAGNOSIS — M54.40 ACUTE RIGHT-SIDED LOW BACK PAIN WITH SCIATICA, SCIATICA LATERALITY UNSPECIFIED: ICD-10-CM

## 2018-12-03 PROCEDURE — 99214 OFFICE O/P EST MOD 30 MIN: CPT | Performed by: NURSE PRACTITIONER

## 2018-12-03 RX ORDER — GABAPENTIN 600 MG/1
600 TABLET ORAL 3 TIMES DAILY
Qty: 90 TAB | Refills: 3 | Status: SHIPPED | OUTPATIENT
Start: 2018-12-03 | End: 2019-06-25 | Stop reason: SDUPTHER

## 2018-12-03 RX ORDER — TRAZODONE HYDROCHLORIDE 100 MG/1
200 TABLET ORAL NIGHTLY PRN
Qty: 60 TAB | Refills: 0 | Status: SHIPPED | OUTPATIENT
Start: 2018-12-03 | End: 2019-01-06 | Stop reason: SDUPTHER

## 2018-12-03 NOTE — ASSESSMENT & PLAN NOTE
Chronic in nature.  Worsening.  Patient has been taking duloxetine for pain and states that he had an anxiety attack and was seen at [facility] regarding this issue. States MI was ruled out.  Patient continues to report nausea, insomnia he states this is likely related to coming off Cymbalta.  Patient refuses to start a low-dose of Cymbalta for tapering at this time.  Counseled patient regarding risk of serotonin syndrome. Will follow closely.  Patient is having difficulty falling asleep and staying asleep states that he did take increased dose of trazodone, states that increase helped with symptoms.

## 2018-12-03 NOTE — PROGRESS NOTES
Chief Complaint   Patient presents with   • Hospital Follow-up     chest px caused by stress       HISTORY OF PRESENT ILLNESS: Patient is a 43 y.o. male established patient who presents today to discuss pain, insomnia, anxiety.    Hypertension  Chronic in nature. Stable. Blood pressure today is 120/88.  Patient denies chest pain, palpitations, dizziness, blurry vision.  States he is doing well on current medication.    Right-sided low back pain with sciatica  Chronic in nature.  Patient continues to have pain.  Patient is status post lumbar fusion.  Continues to follow with spine and has an appointment with pain management physician tomorrow. States pain is 8/10.    Primary insomnia  Chronic in nature.  Worsening.  Patient has been taking duloxetine for pain and states that he had an anxiety attack and was seen at [place] regarding this issue. States MI was ruled out.  Patient continues to report nausea, insomnia he states this is likely related to coming off Cymbalta.  Patient refuses to start a low-dose of Cymbalta for tapering at this time.  Counseled patient regarding risk of serotonin syndrome. Will follow closely.  Patient is having difficulty falling asleep and staying asleep states that he did take increased dose of trazodone, states that increase helped with symptoms.       Patient Active Problem List    Diagnosis Date Noted   • Chronic use of opiate drugs therapeutic purposes 09/25/2018   • Bronchitis 08/31/2018   • Changing nevus 08/31/2018   • Nicotine addiction 10/18/2017   • Chronic pain of right knee 10/18/2017   • Other chronic pain 10/18/2017   • Environmental allergies 06/07/2017   • Right-sided low back pain with sciatica 06/07/2017   • ED (erectile dysfunction) 06/07/2017   • Primary insomnia 03/01/2017   • Obesity (BMI 30-39.9) 03/01/2017   • Right shoulder pain 11/29/2016   • Hypertension 08/19/2016   • Bursitis of right shoulder 08/19/2016       Allergies:Shellfish allergy    Current  Outpatient Prescriptions   Medication Sig Dispense Refill   • traZODone (DESYREL) 100 MG Tab Take 2 Tabs by mouth at bedtime as needed for Sleep. 60 Tab 0   • gabapentin (NEURONTIN) 600 MG tablet Take 1 Tab by mouth 3 times a day. 90 Tab 3   • losartan-hydrochlorothiazide (HYZAAR) 100-12.5 MG per tablet Take 1 Tab by mouth every day. 90 Tab 3   • albuterol 108 (90 Base) MCG/ACT Aero Soln inhalation aerosol Inhale 2 Puffs by mouth every 6 hours as needed. 8.5 g 3   • tadalafil (CIALIS) 10 MG tablet Take 1 Tab by mouth as needed for Erectile Dysfunction. 60 Tab 5   • Calcium Carbonate Antacid (TUMS PO) Take  by mouth as needed.     • tizanidine (ZANAFLEX) 4 MG Tab Take 4 mg by mouth at bedtime as needed.       No current facility-administered medications for this visit.        Social History   Substance Use Topics   • Smoking status: Current Every Day Smoker     Packs/day: 0.25     Years: 6.00     Types: Cigarettes   • Smokeless tobacco: Never Used   • Alcohol use No       Family Status   Relation Status   • Mo Alive   • Fa Alive   • Bro Alive   • Bro Alive     Family History   Problem Relation Age of Onset   • Lung Disease Mother         COPD   • Arthritis Mother         RA   • Cancer Father         leukemia, prostate cancer   • No Known Problems Brother    • No Known Problems Brother        Review of Systems:   Constitutional:  Negative for fever, chills, weight loss and malaise/fatigue.   HEENT:  Negative for ear pain, nosebleeds, congestion, sore throat and neck pain.    Eyes:  Negative for blurred vision.   Respiratory:  Negative for cough, sputum production, shortness of breath and wheezing.    Cardiovascular:  Negative for chest pain, palpitations, orthopnea and leg swelling.   Gastrointestinal:  Negative for heartburn, nausea, vomiting and abdominal pain.   Genitourinary:  Negative for dysuria, urgency and frequency.   Musculoskeletal: Positive for myalgias, back pain and joint pain.   Skin:  Negative for rash  "and itching.   Neurological:  Negative for dizziness, tingling, tremors, sensory change, focal weakness and headaches.   Endo/Heme/Allergies:  Does not bruise/bleed easily.   Psychiatric/Behavioral:  Negative for depression, suicidal ideas and memory loss.  The patient is not nervous/anxious and does not have insomnia.    All other systems reviewed and are negative except as in HPI.    Exam:  Blood pressure 120/88, pulse 60, temperature 37.4 °C (99.3 °F), resp. rate 16, height 1.803 m (5' 10.98\"), weight 98 kg (216 lb), SpO2 98 %.  General:  Normal appearing. No distress.  Pulmonary:  Clear to ausculation.  Normal effort. No rales, ronchi, or wheezing.  Cardiovascular:  Regular rate and rhythm without murmur. Carotid and radial pulses are intact and equal bilaterally.  Neurologic:  Grossly nonfocal  Lymph:  No cervical, supraclavicular or axillary lymph nodes are palpable  Skin:  Warm and dry.  No obvious lesions.  Musculoskeletal:  Normal gait. No extremity cyanosis, clubbing, or edema. Tenderness to palpation of lower back, incision CDI  Psych:  Normal mood and affect. Alert and oriented x3. Judgment and insight is normal.      PLAN:    1. Acute right-sided low back pain with sciatica, sciatica laterality unspecified  Plan to restart gabapentin counseled patient to monitor for side effects discussed risks, benefits, side effects.  - gabapentin (NEURONTIN) 600 MG tablet; Take 1 Tab by mouth 3 times a day.  Dispense: 90 Tab; Refill: 3    2. Primary insomnia  Increased dose at this time will counseled regarding risk and benefits of side effects.  - traZODone (DESYREL) 100 MG Tab; Take 2 Tabs by mouth at bedtime as needed for Sleep.  Dispense: 60 Tab; Refill: 0    3. S/P lumbar fusion  Continue follow-up with specialist    4. Essential hypertension  Continue current medication.    Counseled patient regarding risk of serotonin syndrome.  Counseled extensively regarding symptoms, reasons to follow-up in the hospital.  " Counseled patient regarding my recommendation of starting a low-dose of Cymbalta and weaning off of the medication appropriately patient declines at this time.    Counseled patient regarding side effects of Neurontin, discussed monitoring sleepiness.  Discussed with patient calling for any concerns. Patient is encouraged to be seen in the emergency room for chest pain, palpitations, shortness of breath, dizziness, severe abdominal pain or other concerning symptoms.    Please note that this dictation was created using voice recognition software. I have made every reasonable attempt to correct obvious errors, but I expect that there are errors of grammar and possibly content that I did not discover before finalizing the note.    Assessment/Plan:  1. Acute right-sided low back pain with sciatica, sciatica laterality unspecified  gabapentin (NEURONTIN) 600 MG tablet   2. Primary insomnia  traZODone (DESYREL) 100 MG Tab   3. S/P lumbar fusion     4. Essential hypertension            I have placed the below orders and discussed them with an approved delegating provider. The MA is performing the below orders under the direction of Dr. Laird.

## 2018-12-03 NOTE — ASSESSMENT & PLAN NOTE
Chronic in nature. Stable. Blood pressure today is 120/88.  Patient denies chest pain, palpitations, dizziness, blurry vision.  States he is doing well on current medication.

## 2018-12-03 NOTE — LETTER
[facility]  LORENZO ContrerasP.RTHANG  [address]  [place] [address]  Fax: [phone]   Authorization for Release/Disclosure of   Protected Health Information   Name: LEO RINCON : 1975 SSN: [id number]   Address: [address] [place] [address] Phone:    [phone] (home)    I authorize the entity listed below to release/disclose the PHI below to:   [facility]/BONNIE Contreras.P.R.NGUYEN. and BONNIE Contreras.P.RHANNAH.   Provider or Entity Name:  [facility], [facility], [facility]   Phone:      Fax:     Reason for request: continuity of care   Information to be released:    [  ] LAST COLONOSCOPY,  including any PATH REPORT and follow-up  [  ] LAST FIT/COLOGUARD RESULT [  ] LAST DEXA  [  ] LAST MAMMOGRAM  [  ] LAST PAP  [  ] LAST LABS [  ] RETINA EXAM REPORT  [  ] IMMUNIZATION RECORDS  [ x ] Release all info      [  ] Check here and initial the line next to each item to release ALL health information INCLUDING  _____ Care and treatment for drug and / or alcohol abuse  _____ HIV testing, infection status, or AIDS  _____ Genetic Testing    DATES OF SERVICE OR TIME PERIOD TO BE DISCLOSED: _____________  I understand and acknowledge that:  * This Authorization may be revoked at any time by you in writing, except if your health information has already been used or disclosed.  * Your health information that will be used or disclosed as a result of you signing this authorization could be re-disclosed by the recipient. If this occurs, your re-disclosed health information may no longer be protected by State or Federal laws.  * You may refuse to sign this Authorization. Your refusal will not affect your ability to obtain treatment.  * This Authorization becomes effective upon signing and will  on (date) __________.      If no date is indicated, this Authorization will  one (1) year from the signature date.    Name: Leo Rincon    Signature:   Date:        12/3/2018       PLEASE FAX REQUESTED RECORDS BACK TO: [phone]

## 2018-12-03 NOTE — ASSESSMENT & PLAN NOTE
Chronic in nature.  Patient continues to have pain.  Patient is status post lumbar fusion.  Continues to follow with spine and has an appointment with pain management physician tomorrow. States pain is 8/10.

## 2019-01-06 DIAGNOSIS — F51.01 PRIMARY INSOMNIA: ICD-10-CM

## 2019-01-07 ENCOUNTER — OFFICE VISIT (OUTPATIENT)
Dept: MEDICAL GROUP | Facility: PHYSICIAN GROUP | Age: 44
End: 2019-01-07
Payer: COMMERCIAL

## 2019-01-07 VITALS
SYSTOLIC BLOOD PRESSURE: 146 MMHG | HEIGHT: 71 IN | TEMPERATURE: 97.8 F | BODY MASS INDEX: 31.08 KG/M2 | WEIGHT: 222 LBS | DIASTOLIC BLOOD PRESSURE: 90 MMHG | OXYGEN SATURATION: 98 % | RESPIRATION RATE: 16 BRPM | HEART RATE: 88 BPM

## 2019-01-07 DIAGNOSIS — F51.01 PRIMARY INSOMNIA: ICD-10-CM

## 2019-01-07 DIAGNOSIS — F41.9 ANXIETY: ICD-10-CM

## 2019-01-07 DIAGNOSIS — M54.40 ACUTE RIGHT-SIDED LOW BACK PAIN WITH SCIATICA, SCIATICA LATERALITY UNSPECIFIED: ICD-10-CM

## 2019-01-07 DIAGNOSIS — F17.219 CIGARETTE NICOTINE DEPENDENCE WITH NICOTINE-INDUCED DISORDER: ICD-10-CM
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
## 2019-01-07 DIAGNOSIS — I10 ESSENTIAL HYPERTENSION: ICD-10-CM

## 2019-01-07 PROBLEM — D22.9 CHANGING NEVUS: Status: RESOLVED | Noted: 2018-08-31 | Resolved: 2019-01-07

## 2019-01-07 PROBLEM — J40 BRONCHITIS: Status: RESOLVED | Noted: 2018-08-31 | Resolved: 2019-01-07

## 2019-01-07 PROCEDURE — 99214 OFFICE O/P EST MOD 30 MIN: CPT | Performed by: NURSE PRACTITIONER

## 2019-01-07 RX ORDER — BUPROPION HYDROCHLORIDE 150 MG/1
150 TABLET, EXTENDED RELEASE ORAL 2 TIMES DAILY
Qty: 60 TAB | Refills: 0 | Status: SHIPPED | OUTPATIENT
Start: 2019-01-07 | End: 2019-02-08 | Stop reason: SDUPTHER

## 2019-01-07 RX ORDER — TRAZODONE HYDROCHLORIDE 100 MG/1
TABLET ORAL
Qty: 60 TAB | Refills: 0 | Status: SHIPPED | OUTPATIENT
Start: 2019-01-07 | End: 2019-02-08 | Stop reason: SDUPTHER

## 2019-01-07 ASSESSMENT — PATIENT HEALTH QUESTIONNAIRE - PHQ9: CLINICAL INTERPRETATION OF PHQ2 SCORE: 0

## 2019-01-07 NOTE — ASSESSMENT & PLAN NOTE
Chronic in nature. States he has tried nicotine patches, gum, cold turkey. States he has had bad dreams normally if he remembers his dreams.  Patient states that he has been noticing some increase in anxiety recently.  Patient states that he has had a lot of difficulty quitting and would like help.

## 2019-01-07 NOTE — ASSESSMENT & PLAN NOTE
Chronic in nature.  Stable.  Blood pressure is elevated today.  Patient states that he did take his blood pressure medication this morning but states that he has had multiple cups of coffee prior to his appointment.  Patient states that at home blood pressures have been running 120s-130s over 80s.  Patient denies chest pain, palpitations, dizziness, blurry vision.

## 2019-01-07 NOTE — ASSESSMENT & PLAN NOTE
Chronic in nature.  Worsening recently related to slip on ice.  Patient is following up with pain management regarding this issue.  Patient states today his pain is 8-9 out of 10.  Patient is having muscle spasms and is going to discuss muscle relaxers with the pain management physician tomorrow.

## 2019-01-07 NOTE — PROGRESS NOTES
Chief Complaint   Patient presents with   • Blood Pressure Problem     Recall    • Nicotine Dependence     Wants to stop        HISTORY OF PRESENT ILLNESS: Patient is a 43 y.o. male established patient who presents today to discuss smoking cessation.    Fall from slipping on ice  This is a continued problem.  Slipped on ice, caught himself. Lower back strain. Pain is managed by pain management. Had 12 trigger point injections. States he is still on light duty at work. States he is at 11 weeks post-surgery. States that pain has been severe. States that he has been taking muscle relaxer and stronger pain medication.     Nicotine addiction  Chronic in nature. States he has tried nicotine patches, gum, cold turkey. States he has had bad dreams normally if he remembers his dreams.  Patient states that he has been noticing some increase in anxiety recently.  Patient states that he has had a lot of difficulty quitting and would like help.    Right-sided low back pain with sciatica  Chronic in nature.  Worsening recently related to slip on ice.  Patient is following up with pain management regarding this issue.  Patient states today his pain is 8-9 out of 10.  Patient is having muscle spasms and is going to discuss muscle relaxers with the pain management physician tomorrow.    Primary insomnia  Chronic in nature.  Stable.  Patient states that trazodone has been working well.  Patient states that he has been falling asleep more easily and sleeping longer hours at a time.  Patient states that he has had a lot of increased pain which has been making sleep more difficult recently. States he is overall feeling well     Hypertension  Chronic in nature.  Stable.  Blood pressure is elevated today.  Patient states that he did take his blood pressure medication this morning but states that he has had multiple cups of coffee prior to his appointment.  Patient states that at home blood pressures have been running 120s-130s over 80s.   Patient denies chest pain, palpitations, dizziness, blurry vision.      Patient Active Problem List    Diagnosis Date Noted   • Fall from slipping on ice 01/07/2019   • Chronic use of opiate drugs therapeutic purposes 09/25/2018   • Nicotine addiction 10/18/2017   • Chronic pain of right knee 10/18/2017   • Other chronic pain 10/18/2017   • Environmental allergies 06/07/2017   • Right-sided low back pain with sciatica 06/07/2017   • ED (erectile dysfunction) 06/07/2017   • Primary insomnia 03/01/2017   • Obesity (BMI 30-39.9) 03/01/2017   • Right shoulder pain 11/29/2016   • Hypertension 08/19/2016   • Bursitis of right shoulder 08/19/2016       Allergies:Shellfish allergy    Current Outpatient Prescriptions   Medication Sig Dispense Refill   • buPROPion SR (WELLBUTRIN-SR) 150 MG TABLET SR 12 HR sustained-release tablet Take 1 Tab by mouth 2 times a day. Take daily for the first 3 days then take twice. 60 Tab 0   • traZODone (DESYREL) 100 MG Tab TAKE 2 TABLETS BY MOUTH AT BEDTIME AS NEEDED FOR SLEEP. 60 Tab 0   • gabapentin (NEURONTIN) 600 MG tablet Take 1 Tab by mouth 3 times a day. 90 Tab 3   • losartan-hydrochlorothiazide (HYZAAR) 100-12.5 MG per tablet Take 1 Tab by mouth every day. 90 Tab 3   • albuterol 108 (90 Base) MCG/ACT Aero Soln inhalation aerosol Inhale 2 Puffs by mouth every 6 hours as needed. 8.5 g 3   • tadalafil (CIALIS) 10 MG tablet Take 1 Tab by mouth as needed for Erectile Dysfunction. 60 Tab 5   • Calcium Carbonate Antacid (TUMS PO) Take  by mouth as needed.     • tizanidine (ZANAFLEX) 4 MG Tab Take 4 mg by mouth at bedtime as needed.       No current facility-administered medications for this visit.        Social History   Substance Use Topics   • Smoking status: Current Every Day Smoker     Packs/day: 0.25     Years: 6.00     Types: Cigarettes   • Smokeless tobacco: Never Used   • Alcohol use No       Family Status   Relation Status   • Mo Alive   • Fa Alive   • Bro Alive   • Bro Alive  "    Family History   Problem Relation Age of Onset   • Lung Disease Mother         COPD   • Arthritis Mother         RA   • Cancer Father         leukemia, prostate cancer   • No Known Problems Brother    • No Known Problems Brother        Review of Systems:   Constitutional:  Negative for fever, chills, weight loss and malaise/fatigue.   HEENT:  Negative for ear pain, nosebleeds, congestion, sore throat and neck pain.    Eyes:  Negative for blurred vision.   Respiratory:  Negative for cough, sputum production, shortness of breath and wheezing.    Cardiovascular:  Negative for chest pain, palpitations, orthopnea and leg swelling.   Musculoskeletal: Positive for myalgias, back pain and joint pain.   Skin:  Negative for rash and itching.   Neurological:  Negative for dizziness, tingling, tremors, sensory change, focal weakness and headaches.   Psychiatric/Behavioral:  Negative for depression, suicidal ideas and memory loss.  The patient is not nervous/anxious and does not have insomnia.    All other systems reviewed and are negative except as in HPI.    Exam:  Blood pressure 146/90, pulse 88, temperature 36.6 °C (97.8 °F), temperature source Temporal, resp. rate 16, height 1.803 m (5' 10.98\"), weight 100.7 kg (222 lb), SpO2 98 %.  General:  Normal appearing. No distress.  Pulmonary:  Clear to ausculation.  Normal effort. No rales, ronchi, or wheezing.  Cardiovascular:  Regular rate and rhythm without murmur. Carotid and radial pulses are intact and equal bilaterally.  Neurologic:  Grossly nonfocal  Skin:  Warm and dry.  No obvious lesions.  Musculoskeletal:  Normal gait. No extremity cyanosis, clubbing, or edema.  Psych:  Normal mood and affect. Alert and oriented x3. Judgment and insight is normal.      PLAN:    1. Fall due to slipping on ice or snow, initial encounter  Continue follow-up with pain management.    2. Cigarette nicotine dependence with nicotine-induced disorder  Counseled patient regarding setting a " quit date, discussed use of medication and how medication works to help with smoking cessation counseled regarding risk, benefits.  Patient decides to go with Wellbutrin versus Chantix related to concern with vivid dreams.  - buPROPion SR (WELLBUTRIN-SR) 150 MG TABLET SR 12 HR sustained-release tablet; Take 1 Tab by mouth 2 times a day. Take daily for the first 3 days then take twice.  Dispense: 60 Tab; Refill: 0    3. Anxiety  Patient has noticed increasing anxiety, frequent irritation over multiple months has declined medication in the past states that he would like to quit smoking, plan to see if medication offers benefits for both concerns.  - buPROPion SR (WELLBUTRIN-SR) 150 MG TABLET SR 12 HR sustained-release tablet; Take 1 Tab by mouth 2 times a day. Take daily for the first 3 days then take twice.  Dispense: 60 Tab; Refill: 0    4. Acute right-sided low back pain with sciatica, sciatica laterality unspecified  Attending follow-up with pain management.    5. Primary insomnia  Continue current plan of care.    6. Essential hypertension  Continue current medication.    Follow-up in 1 month to discuss Wellbutrin. Patient is encouraged to be seen in the emergency room for chest pain, palpitations, shortness of breath, dizziness, severe abdominal pain or other concerning symptoms.    Please note that this dictation was created using voice recognition software. I have made every reasonable attempt to correct obvious errors, but I expect that there are errors of grammar and possibly content that I did not discover before finalizing the note.    Assessment/Plan:  1. Fall due to slipping on ice or snow, initial encounter     2. Cigarette nicotine dependence with nicotine-induced disorder  buPROPion SR (WELLBUTRIN-SR) 150 MG TABLET SR 12 HR sustained-release tablet   3. Anxiety  buPROPion SR (WELLBUTRIN-SR) 150 MG TABLET SR 12 HR sustained-release tablet   4. Acute right-sided low back pain with sciatica, sciatica  laterality unspecified     5. Primary insomnia     6. Essential hypertension            I have placed the below orders and discussed them with an approved delegating provider. The MA is performing the below orders under the direction of Dr. Laird.

## 2019-01-07 NOTE — ASSESSMENT & PLAN NOTE
Chronic in nature.  Stable.  Patient states that trazodone has been working well.  Patient states that he has been falling asleep more easily and sleeping longer hours at a time.  Patient states that he has had a lot of increased pain which has been making sleep more difficult recently. States he is overall feeling well

## 2019-01-07 NOTE — ASSESSMENT & PLAN NOTE
This is a continued problem.  Slipped on ice, caught himself. Lower back strain. Pain is managed by pain management. Had 12 trigger point injections. States he is still on light duty at work. States he is at 11 weeks post-surgery. States that pain has been severe. States that he has been taking muscle relaxer and stronger pain medication.

## 2019-02-11 ENCOUNTER — OFFICE VISIT (OUTPATIENT)
Dept: MEDICAL GROUP | Facility: PHYSICIAN GROUP | Age: 44
End: 2019-02-11
Payer: COMMERCIAL

## 2019-02-11 VITALS
HEIGHT: 71 IN | DIASTOLIC BLOOD PRESSURE: 88 MMHG | HEART RATE: 77 BPM | TEMPERATURE: 97.7 F | SYSTOLIC BLOOD PRESSURE: 128 MMHG | WEIGHT: 217 LBS | RESPIRATION RATE: 16 BRPM | BODY MASS INDEX: 30.38 KG/M2 | OXYGEN SATURATION: 94 %

## 2019-02-11 DIAGNOSIS — M54.40 ACUTE RIGHT-SIDED LOW BACK PAIN WITH SCIATICA, SCIATICA LATERALITY UNSPECIFIED: ICD-10-CM

## 2019-02-11 DIAGNOSIS — F41.9 ANXIETY: ICD-10-CM

## 2019-02-11 DIAGNOSIS — I10 ESSENTIAL HYPERTENSION: ICD-10-CM

## 2019-02-11 DIAGNOSIS — M25.511 RIGHT SHOULDER PAIN, UNSPECIFIED CHRONICITY: ICD-10-CM

## 2019-02-11 DIAGNOSIS — M75.51 BURSITIS OF RIGHT SHOULDER: ICD-10-CM

## 2019-02-11 PROCEDURE — 99214 OFFICE O/P EST MOD 30 MIN: CPT | Mod: 25 | Performed by: NURSE PRACTITIONER

## 2019-02-11 PROCEDURE — 96372 THER/PROPH/DIAG INJ SC/IM: CPT | Performed by: NURSE PRACTITIONER

## 2019-02-11 RX ORDER — KETOROLAC TROMETHAMINE 30 MG/ML
60 INJECTION, SOLUTION INTRAMUSCULAR; INTRAVENOUS ONCE
Status: COMPLETED | OUTPATIENT
Start: 2019-02-11 | End: 2019-02-11

## 2019-02-11 RX ORDER — OXYCODONE AND ACETAMINOPHEN 7.5; 325 MG/1; MG/1
1 TABLET ORAL DAILY
COMMUNITY
Start: 2019-01-16 | End: 2019-03-11

## 2019-02-11 RX ORDER — METHOCARBAMOL 500 MG/1
500 TABLET, FILM COATED ORAL
COMMUNITY
Start: 2019-02-05 | End: 2019-03-11

## 2019-02-11 RX ADMIN — KETOROLAC TROMETHAMINE 60 MG: 30 INJECTION, SOLUTION INTRAMUSCULAR; INTRAVENOUS at 09:43

## 2019-02-11 NOTE — PROGRESS NOTES
Chief Complaint   Patient presents with   • Medication Problem     [place]        HISTORY OF THE PRESENT ILLNESS: This is a 43 y.o. male established patient who presents today for follow up.    Anxiety & Primary Insomnia  Patient states he is doing well on his prescription of Wellbutrin. He has been sleeping more regularly, but he admits he had some difficulty related to his chronic pain. He states he is pleased with the changes, and he would like to continue his current dosage. He reports improvement of anxiety, denies SI/HI. States his wife has noticed a difference.  States that he has had some increased fatigue but he believes this is related to increased pain as he is weaning off of pain medication through pain management.  He does not wish to change medication at this time.    Right-sided low back pain with sciatica  Chronic in nature. Patient has been following up with pain management specialists [facility] through the surgical office for this problem. He states he has had some improvement after having a posterior lumbar fusion including decreased numbness/tingling in his leg. He states he has the most difficulty in the morning. Throughout the day, he is able to do some movement, but it is minimal. His pain is worsened with He states he is not pleased with his current patient care through the pain specialists as they have reduced his medication dosage quickly following his back surgery without controlling his back pain. States pain is frequently 8/10. He is asking whether he is able to have Toradol injections as these improved his pain while admitted into the hospital. He was also advised to take Ibuprofen more regularly as Tylenol has not improved his pain. With his history of kidney failure, he is aware this is a nephrotoxic agent.    Hypertension  Chronic in nature. Well-controlled on Losartan-HCTZ. Denies lightheadedness, vision changes, headache, palpitations or leg swelling.      Past  Medical History:   Diagnosis Date   • Elevated cholesterol    • Heart burn    • Hypertension    • Pain     right shoulder       Past Surgical History:   Procedure Laterality Date   • SHOULDER DECOMPRESSION ARTHROSCOPIC Right 11/29/2016    Procedure: SHOULDER DECOMPRESSION ARTHROSCOPIC - SUBACROMIAL W/LABRAL DEBRIDEMENT;  Surgeon: Linnette Freeman M.D.;  Location: SURGERY [facility];  Service:    • CLAVICLE DISTAL EXCISION  11/29/2016    Procedure: CLAVICLE DISTAL EXCISION;  Surgeon: Linnette Freeman M.D.;  Location: SURGERY [facility];  Service:    • APPENDECTOMY  1988       Family Status   Relation Status   • Mo Alive   • Fa Alive   • Bro Alive   • Bro Alive     Family History   Problem Relation Age of Onset   • Lung Disease Mother         COPD   • Arthritis Mother         RA   • Cancer Father         leukemia, prostate cancer   • No Known Problems Brother    • No Known Problems Brother        Social History   Substance Use Topics   • Smoking status: Current Every Day Smoker     Packs/day: 0.25     Years: 6.00     Types: Cigarettes   • Smokeless tobacco: Never Used      Comment: very little    • Alcohol use No       Allergies: Shellfish allergy    Current Outpatient Prescriptions Ordered in [facility]   Medication Sig Dispense Refill   • gabapentin (NEURONTIN) 600 MG tablet Take 1 Tab by mouth 3 times a day. 90 Tab 3   • losartan-hydrochlorothiazide (HYZAAR) 100-12.5 MG per tablet Take 1 Tab by mouth every day. 90 Tab 3   • albuterol 108 (90 Base) MCG/ACT Aero Soln inhalation aerosol Inhale 2 Puffs by mouth every 6 hours as needed. 8.5 g 3   • tadalafil (CIALIS) 10 MG tablet Take 1 Tab by mouth as needed for Erectile Dysfunction. 60 Tab 5   • Calcium Carbonate Antacid (TUMS PO) Take  by mouth as needed.     • tizanidine (ZANAFLEX) 4 MG Tab Take 4 mg by mouth at bedtime as needed.     • traZODone (DESYREL) 100 MG Tab TAKE TWO TABLETS BY MOUTH AT BEDTIME AS NEEDED FOR SLEEP 180 Tab 3   • buPROPion SR  "(WELLBUTRIN-SR) 150 MG TABLET SR 12 HR sustained-release tablet TAKE 1 TABLET BY MOUTH ONCE DAILY FOR FIRST 3 DAYS THEN TAKE 1 TABLET TWICE DAILY THEREAFTER AS DIRECTED BY DR Arreguin Tab 3     No current Epic-ordered facility-administered medications on file.        Review of Systems   Musculoskeletal: Chronic lower back pain.   Neurological: Negative for numbness, dizziness, tingling, tremors, sensory change, focal weakness and headaches.   Psychiatric/Behavioral: Improved anxiety and insomnia.  All other systems reviewed and are negative except as in HPI.    Exam: Blood pressure 128/88, pulse 77, temperature 36.5 °C (97.7 °F), temperature source Temporal, resp. rate 16, height 1.803 m (5' 10.98\"), weight 98.4 kg (217 lb), SpO2 94 %.  General:  Normal appearing. No distress.  Pulmonary:  Clear to ausculation.  Normal effort. No rales, rhonchi, or wheezing.  Cardiovascular:  Regular rate and rhythm without murmur. Carotid and radial pulses are intact and equal bilaterally.  Neurologic:  Grossly nonfocal  Lymph:  No cervical, supraclavicular or axillary lymph nodes are palpable  Skin:  Warm and dry.  No obvious lesions.  Musculoskeletal:  SPINE:  Mild tenderness in paraspinous muscles lumbar spine with current spasm. Normal gait. No extremity cyanosis, clubbing, or edema.  Psych:  Normal mood and affect. Alert and oriented x3. Judgment and insight is normal.      PLAN:  1. Essential hypertension  Well-controlled on current medications. Continue to monitor.    2. Anxiety  Improved sleep and mood on Wellbutrin. He would like to continue with the current dosage.     3. Bursitis of right shoulder  Patient has been having continued shoulder pain along with his other chronic issues. He would like this issue to be addressed with a pain management consult. He has had some relief from taking ibuprofen, but we will check his kidney function via CMP because of his history of kidney failure.  - Comp Metabolic Panel; Future  - " REFERRAL TO PAIN MANAGEMENT    4. Acute right-sided low back pain with sciatica, sciatica laterality unspecified  Patient states he will not follow-up with [facility] any longer as they have not been managing his pain well. I have referred him to another pain specialist for further follow-up. He will receive a Toradol injection in the office today. CMP has been ordered because of his history of kidney failure and since he was recommended to take Ibuprofen to manage his pain.  - Comp Metabolic Panel; Future  - REFERRAL TO PAIN MANAGEMENT  - ketorolac (TORADOL) injection 60 mg; 2 mL by Intramuscular route Once.    5. Right shoulder pain, unspecified chronicity  See number 3.  - REFERRAL TO PAIN MANAGEMENT    Follow-up in 1 month to discuss pain. Patient is encouraged to be seen in the emergency room for chest pain, palpitations, shortness of breath, dizziness, severe abdominal pain or other concerning symptoms.      Assessment/Plan  1. Essential hypertension     2. Anxiety     3. Bursitis of right shoulder  Comp Metabolic Panel    REFERRAL TO PAIN MANAGEMENT   4. Acute right-sided low back pain with sciatica, sciatica laterality unspecified  Comp Metabolic Panel    REFERRAL TO PAIN MANAGEMENT    ketorolac (TORADOL) injection 60 mg   5. Right shoulder pain, unspecified chronicity  REFERRAL TO PAIN MANAGEMENT         I have placed the below orders and discussed them with an approved delegating provider. The MA is performing the below orders under the direction of Dr. Laird.       Harvey ADAN (Scribe), am scribing for, and in the presence of, DANIEL Ruvalcaba    Electronically signed by: Harvey Mims ([place]), 2/11/2019    Ash ADAN APRN personally performed the services described in this documentation, as scribed by Harvey Mims in my presence, and it is both accurate and complete.

## 2019-03-11 ENCOUNTER — OFFICE VISIT (OUTPATIENT)
Dept: MEDICAL GROUP | Facility: PHYSICIAN GROUP | Age: 44
End: 2019-03-11
Payer: COMMERCIAL

## 2019-03-11 VITALS
HEART RATE: 70 BPM | DIASTOLIC BLOOD PRESSURE: 80 MMHG | TEMPERATURE: 97.7 F | SYSTOLIC BLOOD PRESSURE: 116 MMHG | OXYGEN SATURATION: 96 % | BODY MASS INDEX: 30.66 KG/M2 | WEIGHT: 219 LBS | RESPIRATION RATE: 14 BRPM | HEIGHT: 71 IN

## 2019-03-11 DIAGNOSIS — Z91.09 ENVIRONMENTAL ALLERGIES: ICD-10-CM

## 2019-03-11 DIAGNOSIS — Z79.891 CHRONIC USE OF OPIATE DRUGS THERAPEUTIC PURPOSES: ICD-10-CM

## 2019-03-11 DIAGNOSIS — M54.40 ACUTE RIGHT-SIDED LOW BACK PAIN WITH SCIATICA, SCIATICA LATERALITY UNSPECIFIED: ICD-10-CM
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
## 2019-03-11 DIAGNOSIS — M25.511 RIGHT SHOULDER PAIN, UNSPECIFIED CHRONICITY: ICD-10-CM

## 2019-03-11 DIAGNOSIS — I10 ESSENTIAL HYPERTENSION: ICD-10-CM

## 2019-03-11 PROCEDURE — 96372 THER/PROPH/DIAG INJ SC/IM: CPT | Performed by: NURSE PRACTITIONER

## 2019-03-11 PROCEDURE — 99214 OFFICE O/P EST MOD 30 MIN: CPT | Mod: 25 | Performed by: NURSE PRACTITIONER

## 2019-03-11 RX ORDER — TIZANIDINE 4 MG/1
4 TABLET ORAL EVERY 6 HOURS PRN
Qty: 90 TAB | Refills: 3 | Status: SHIPPED | OUTPATIENT
Start: 2019-03-11 | End: 2020-11-05

## 2019-03-11 RX ORDER — KETOROLAC TROMETHAMINE 30 MG/ML
60 INJECTION, SOLUTION INTRAMUSCULAR; INTRAVENOUS ONCE
Status: COMPLETED | OUTPATIENT
Start: 2019-03-11 | End: 2019-03-11

## 2019-03-11 RX ORDER — TRIAMCINOLONE ACETONIDE 40 MG/ML
40 INJECTION, SUSPENSION INTRA-ARTICULAR; INTRAMUSCULAR ONCE
Status: COMPLETED | OUTPATIENT
Start: 2019-03-11 | End: 2019-03-11

## 2019-03-11 RX ORDER — OXYCODONE HYDROCHLORIDE 10 MG/1
10 TABLET ORAL 2 TIMES DAILY PRN
Qty: 60 TAB | Refills: 0 | Status: SHIPPED | OUTPATIENT
Start: 2019-03-14 | End: 2019-04-08 | Stop reason: SDUPTHER

## 2019-03-11 RX ADMIN — KETOROLAC TROMETHAMINE 60 MG: 30 INJECTION, SOLUTION INTRAMUSCULAR; INTRAVENOUS at 09:39

## 2019-03-11 RX ADMIN — TRIAMCINOLONE ACETONIDE 40 MG: 40 INJECTION, SUSPENSION INTRA-ARTICULAR; INTRAMUSCULAR at 09:40

## 2019-03-11 NOTE — ASSESSMENT & PLAN NOTE
"Chronic in nature.  Continued.  Patient states that Dr. Phillip told him he would \"always have pain\".  He  States pain is stable at this time.  "

## 2019-03-11 NOTE — ASSESSMENT & PLAN NOTE
"Chronic in nature. Not currently on any pain medication.  States that he ran out of his last prescription approximately 10 days and as he was given 10 tabs for 30 days.  Patient previous to this was on oxycodone 10 mg 4-6 times daily.  States that the 7.5/325 he found did not significantly improve his pain. States that 5 months out of surgery.  States pain continues to be 8-9 out of 10.  States new bed is better, but states he still wakes up in the morning feeling like his \"muscles are bound up\". States that moving is helpful, and that he does better on days that he is work. States he is still doing PT exercises. States that muscle spasms are wrapping around to the hip. States taking 15-20 min to get stretched out for the day.   "

## 2019-03-11 NOTE — PROGRESS NOTES
"Chief Complaint   Patient presents with   • Back Pain     still having pain;        HISTORY OF PRESENT ILLNESS: Patient is a 43 y.o. male established patient who presents today to discuss pain.    Right-sided low back pain with sciatica  Chronic in nature. Not currently on any pain medication.  States that he ran out of his last prescription approximately 10 days and as he was given 10 tabs for 30 days.  Patient previous to this was on oxycodone 10 mg 4-6 times daily.  States that the 7.5/325 he found did not significantly improve his pain. States that 5 months out of surgery.  States pain continues to be 8-9 out of 10.  States new bed is better, but states he still wakes up in the morning feeling like his \"muscles are bound up\". States that moving is helpful, and that he does better on days that he is work. States he is still doing PT exercises. States that muscle spasms are wrapping around to the hip. States taking 15-20 min to get stretched out for the day.     Environmental allergies  Has not had one in 2 years. States they work well to relieve allergies. Patient has a history of seasonal allergies and has tried other modalities without success. Kenalog shots in the past have provided relief. The last one was approximately 24+ months ago. I discussed the benefits, risks and alternatives to Kenalog shot. I also advised patient that Kenalog shots are not standard of care any longer. Remote concern for osteonecrosis the hip was discussed. Patient wishes to proceed.      Hypertension  Tonic in nature.  Stable.  Blood pressure is within normal limits today.  Patient to take his blood pressure medication this morning.  Patient did not drink caffeine prior to his appointment.  Patient states blood sugars at home are within normal limits.  Patient denies chest pain, palpitations, dizziness, blurry vision    Right shoulder pain  Chronic in nature.  Continued.  Patient states that Dr. Phillip told him he would \"always have " "pain\".  He  States pain is stable at this time.    Fall from slipping on ice  Patient continues to have muscle spasms and lower back pain since fall on ice. Patient states that pain at times has been 10 out of 10.  States that pain is not managed at this time.  Patient states that Robaxin has not been working as well as Zanaflex, requesting new prescription for Zanaflex.    Chronic use of opiate drugs therapeutic purposes  Chronic pain recheck for:  Last dose of controlled substance: 15 days ago  Chronic pain treated with oxycodone taken 4-5 times a day    He  reports that he does not drink alcohol.  He  reports that he does not use drugs.    Interval history:   Any major change in health since last appointment? No    Consequences of Chronic Opiate therapy:  (5 A's)  Analgesia: Compared to no treatment or prior treatment, pain is currently improved  Activity: improved  Adverse Events: denies constipation, dry mouth, itchy skin, nausea and sedation  Aberrant Behaviors: He reports he is not taking medication as prescribed, prescription decreased an stopped and is not veering from agreed treatment regimen or provider recommendations. There have been no inappropriate refills or lost/stolen meds reported.  Affect/Mood: Pain is impacting patient's mood.  Patient reports depression/anxiety.    Nonnarcotic treatments that are being used: Tylenol, NSAIDs/MARTÍNEZ-2 and muscle relaxers.     Last imaging: post-surgery    Opioid Risk Score: 1    Interpretation of Opioid Risk Score   Score 0-3 = Low risk of abuse. Do UDS at least once per year.  Score 4-7 = Moderate risk of abuse. Do UDS 1-4 times per year.  Score 8+ = High risk of abuse. Refer to specialist.    Last order of CONTROLLED SUBSTANCE TREATMENT AGREEMENT was found on 10/18/2017 from Office Visit on 10/18/2017     UDS Summary                URINE DRUG SCREEN Next Due 9/20/2019      Done 9/25/2018 [facility] PAIN MANAGEMENT SCREEN        Consult patient that the amount of " medication he was on prior to surgery is above the milliequivalents I am able to prescribe in this office discussed with patient the importance of soon follow-up with pain management as we will not be able to continue his pain medicine through this office.    Most recent UDS reviewed today and is consistent with prescribed medications.  Patient will complete drug screen at next appointment.     I have reviewed the medical records, the Prescription Monitoring Program and I have determined that controlled substance treatment is medically indicated.         Patient Active Problem List    Diagnosis Date Noted   • Anxiety 02/11/2019   • Fall from slipping on ice 01/07/2019   • Chronic use of opiate drugs therapeutic purposes 09/25/2018   • Nicotine addiction 10/18/2017   • Chronic pain of right knee 10/18/2017   • Other chronic pain 10/18/2017   • Environmental allergies 06/07/2017   • Right-sided low back pain with sciatica 06/07/2017   • ED (erectile dysfunction) 06/07/2017   • Primary insomnia 03/01/2017   • Obesity (BMI 30-39.9) 03/01/2017   • Right shoulder pain 11/29/2016   • Hypertension 08/19/2016   • Bursitis of right shoulder 08/19/2016       Allergies:Shellfish allergy    Current Outpatient Prescriptions   Medication Sig Dispense Refill   • tizanidine (ZANAFLEX) 4 MG Tab Take 1 Tab by mouth every 6 hours as needed. 90 Tab 3   • [START ON 3/14/2019] oxyCODONE immediate release (ROXICODONE) 10 MG immediate release tablet Take 1 Tab by mouth 2 times a day as needed for Moderate Pain for up to 30 days. 60 Tab 0   • traZODone (DESYREL) 100 MG Tab TAKE TWO TABLETS BY MOUTH AT BEDTIME AS NEEDED FOR SLEEP 180 Tab 3   • buPROPion SR (WELLBUTRIN-SR) 150 MG TABLET SR 12 HR sustained-release tablet TAKE 1 TABLET BY MOUTH ONCE DAILY FOR FIRST 3 DAYS THEN TAKE 1 TABLET TWICE DAILY THEREAFTER AS DIRECTED BY  90 Tab 3   • gabapentin (NEURONTIN) 600 MG tablet Take 1 Tab by mouth 3 times a day. 90 Tab 3   •  losartan-hydrochlorothiazide (HYZAAR) 100-12.5 MG per tablet Take 1 Tab by mouth every day. 90 Tab 3   • albuterol 108 (90 Base) MCG/ACT Aero Soln inhalation aerosol Inhale 2 Puffs by mouth every 6 hours as needed. 8.5 g 3   • tadalafil (CIALIS) 10 MG tablet Take 1 Tab by mouth as needed for Erectile Dysfunction. 60 Tab 5   • Calcium Carbonate Antacid (TUMS PO) Take  by mouth as needed.       No current facility-administered medications for this visit.        Social History   Substance Use Topics   • Smoking status: Current Every Day Smoker     Packs/day: 0.25     Years: 6.00     Types: Cigarettes   • Smokeless tobacco: Never Used      Comment: very little    • Alcohol use No       Family Status   Relation Status   • Mo Alive   • Fa Alive   • Bro Alive   • Bro Alive     Family History   Problem Relation Age of Onset   • Lung Disease Mother         COPD   • Arthritis Mother         RA   • Cancer Father         leukemia, prostate cancer   • No Known Problems Brother    • No Known Problems Brother        Review of Systems:   Constitutional:  Negative for fever, chills, weight loss and malaise/fatigue.   HEENT:  Negative for ear pain, nosebleeds, congestion, sore throat and neck pain.    Respiratory:  Negative for cough, sputum production, shortness of breath and wheezing.    Cardiovascular:  Negative for chest pain, palpitations, orthopnea and leg swelling.   Gastrointestinal:  Negative for heartburn, nausea, vomiting and abdominal pain.   Genitourinary:  Negative for dysuria, urgency and frequency.   Musculoskeletal:  Negative for myalgias, back pain and joint pain.   Skin:  Negative for rash and itching.   Neurological:  Negative for dizziness, tingling, tremors, sensory change, focal weakness and headaches.   Psychiatric/Behavioral:  Negative for depression, suicidal ideas and memory loss.  The patient is not nervous/anxious and does not have insomnia.    All other systems reviewed and are negative except as in  "HPI.    Exam:  Blood pressure 116/80, pulse 70, temperature 36.5 °C (97.7 °F), temperature source Temporal, resp. rate 14, height 1.803 m (5' 10.98\"), weight 99.3 kg (219 lb), SpO2 96 %.  General:  Normal appearing. No distress.  Pulmonary:  Clear to ausculation.  Normal effort. No rales, ronchi, or wheezing.  Cardiovascular:  Regular rate and rhythm without murmur. Carotid and radial pulses are intact and equal bilaterally.  Abdomen:  Soft, nontender, nondistended. Normal bowel sounds. Liver and spleen are not palpable  Neurologic:  Grossly nonfocal  Lymph:  No cervical, supraclavicular or axillary lymph nodes are palpable  Skin:  Warm and dry.  No obvious lesions.  Musculoskeletal:  Normal gait. No extremity cyanosis, clubbing, or edema. SPINE: No significant spinal curvature on forward bend. Severe tenderness in paraspinous muscles lumbar spine with current spasm. Psych:  Normal mood and affect. Alert and oriented x3. Judgment and insight is normal.      PLAN:    1. Acute right-sided low back pain with sciatica, sciatica laterality unspecified  5. Right shoulder pain, unspecified chronicity  6. Chronic use of opiate drugs therapeutic purposes  Patient will receive injection in the office states that this works well for a couple of days to relieve pain.  Patient has no side effects from this in the past.  Switch Robaxin to Zanaflex  Small prescription for oxycodone discussed that this will be a short-term prescription and we will not be prescribing regularly.  Counseled patient regarding risks, side effects of medication.  Discussed black box warning of benzodiazepines.    - ketorolac (TORADOL) injection 60 mg; 2 mL by Intramuscular route Once.  - tizanidine (ZANAFLEX) 4 MG Tab; Take 1 Tab by mouth every 6 hours as needed.  Dispense: 90 Tab; Refill: 3  - oxyCODONE immediate release (ROXICODONE) 10 MG immediate release tablet; Take 1 Tab by mouth 2 times a day as needed for Moderate Pain for up to 30 days.  " Dispense: 60 Tab; Refill: 0  - Consent for Opiate Prescription    2. Environmental allergies  Consult patient regarding risk, side effects of triamcinolone injection.  Patient states that he had one over 2 years ago and worked well for control of allergies.  - triamcinolone acetonide (KENALOG-40) injection 40 mg; 1 mL by Intramuscular route Once.    3. Fall due to slipping on ice or snow, initial encounter  - ketorolac (TORADOL) injection 60 mg; 2 mL by Intramuscular route Once.    4. Essential hypertension  Continue current medication      Follow-up in 1 month or sooner as needed. Patient is encouraged to be seen in the emergency room for chest pain, palpitations, shortness of breath, dizziness, severe abdominal pain or other concerning symptoms.    Please note that this dictation was created using voice recognition software. I have made every reasonable attempt to correct obvious errors, but I expect that there are errors of grammar and possibly content that I did not discover before finalizing the note.    Assessment/Plan:  1. Acute right-sided low back pain with sciatica, sciatica laterality unspecified  ketorolac (TORADOL) injection 60 mg    tizanidine (ZANAFLEX) 4 MG Tab    oxyCODONE immediate release (ROXICODONE) 10 MG immediate release tablet    Consent for Opiate Prescription   2. Environmental allergies  triamcinolone acetonide (KENALOG-40) injection 40 mg   3. Fall due to slipping on ice or snow, initial encounter  ketorolac (TORADOL) injection 60 mg   4. Essential hypertension     5. Right shoulder pain, unspecified chronicity     6. Chronic use of opiate drugs therapeutic purposes            I have placed the below orders and discussed them with an approved delegating provider. The MA is performing the below orders under the direction of Dr. Laird.

## 2019-03-11 NOTE — ASSESSMENT & PLAN NOTE
Patient continues to have muscle spasms and lower back pain since fall on ice. Patient states that pain at times has been 10 out of 10.  States that pain is not managed at this time.  Patient states that Robaxin has not been working as well as Zanaflex, requesting new prescription for Zanaflex.

## 2019-03-11 NOTE — ASSESSMENT & PLAN NOTE
Tonic in nature.  Stable.  Blood pressure is within normal limits today.  Patient to take his blood pressure medication this morning.  Patient did not drink caffeine prior to his appointment.  Patient states blood sugars at home are within normal limits.  Patient denies chest pain, palpitations, dizziness, blurry vision

## 2019-03-11 NOTE — ASSESSMENT & PLAN NOTE
Chronic pain recheck for:  Last dose of controlled substance: 15 days ago  Chronic pain treated with oxycodone taken 4-5 times a day    He  reports that he does not drink alcohol.  He  reports that he does not use drugs.    Interval history:   Any major change in health since last appointment? No    Consequences of Chronic Opiate therapy:  (5 A's)  Analgesia: Compared to no treatment or prior treatment, pain is currently improved  Activity: improved  Adverse Events: denies constipation, dry mouth, itchy skin, nausea and sedation  Aberrant Behaviors: He reports he is not taking medication as prescribed, prescription decreased an stopped and is not veering from agreed treatment regimen or provider recommendations. There have been no inappropriate refills or lost/stolen meds reported.  Affect/Mood: Pain is impacting patient's mood.  Patient reports depression/anxiety.    Nonnarcotic treatments that are being used: Tylenol, NSAIDs/MARTÍNEZ-2 and muscle relaxers.     Last imaging: post-surgery    Opioid Risk Score: 1    Interpretation of Opioid Risk Score   Score 0-3 = Low risk of abuse. Do UDS at least once per year.  Score 4-7 = Moderate risk of abuse. Do UDS 1-4 times per year.  Score 8+ = High risk of abuse. Refer to specialist.    Last order of CONTROLLED SUBSTANCE TREATMENT AGREEMENT was found on 10/18/2017 from Office Visit on 10/18/2017     UDS Summary                URINE DRUG SCREEN Next Due 9/20/2019      Done 9/25/2018 [facility] PAIN MANAGEMENT SCREEN        Consult patient that the amount of medication he was on prior to surgery is above the milliequivalents I am able to prescribe in this office discussed with patient the importance of soon follow-up with pain management as we will not be able to continue his pain medicine through this office.    Most recent UDS reviewed today and is consistent with prescribed medications.  Patient will complete drug screen at next appointment.     I have reviewed the medical  records, the Prescription Monitoring Program and I have determined that controlled substance treatment is medically indicated.

## 2019-03-11 NOTE — ASSESSMENT & PLAN NOTE
Has not had one in 2 years. States they work well to relieve allergies. Patient has a history of seasonal allergies and has tried other modalities without success. Kenalog shots in the past have provided relief. The last one was approximately 24+ months ago. I discussed the benefits, risks and alternatives to Kenalog shot. I also advised patient that Kenalog shots are not standard of care any longer. Remote concern for osteonecrosis the hip was discussed. Patient wishes to proceed.

## 2019-04-08 ENCOUNTER — TELEPHONE (OUTPATIENT)
Dept: MEDICAL GROUP | Facility: PHYSICIAN GROUP | Age: 44
End: 2019-04-08

## 2019-04-08 ENCOUNTER — OFFICE VISIT (OUTPATIENT)
Dept: MEDICAL GROUP | Facility: PHYSICIAN GROUP | Age: 44
End: 2019-04-08
Payer: COMMERCIAL

## 2019-04-08 VITALS
BODY MASS INDEX: 30.1 KG/M2 | OXYGEN SATURATION: 96 % | RESPIRATION RATE: 16 BRPM | TEMPERATURE: 97.9 F | WEIGHT: 215 LBS | HEART RATE: 74 BPM | DIASTOLIC BLOOD PRESSURE: 88 MMHG | SYSTOLIC BLOOD PRESSURE: 126 MMHG | HEIGHT: 71 IN

## 2019-04-08 DIAGNOSIS — M54.40 ACUTE RIGHT-SIDED LOW BACK PAIN WITH SCIATICA, SCIATICA LATERALITY UNSPECIFIED: ICD-10-CM

## 2019-04-08 DIAGNOSIS — M25.511 RIGHT SHOULDER PAIN, UNSPECIFIED CHRONICITY: ICD-10-CM

## 2019-04-08 DIAGNOSIS — M75.51 BURSITIS OF RIGHT SHOULDER: ICD-10-CM

## 2019-04-08 DIAGNOSIS — M62.830 MUSCLE SPASM OF BACK: ICD-10-CM

## 2019-04-08 DIAGNOSIS — Z79.891 CHRONIC USE OF OPIATE DRUGS THERAPEUTIC PURPOSES: ICD-10-CM

## 2019-04-08 DIAGNOSIS — I10 ESSENTIAL HYPERTENSION: ICD-10-CM

## 2019-04-08 PROCEDURE — 99214 OFFICE O/P EST MOD 30 MIN: CPT | Performed by: NURSE PRACTITIONER

## 2019-04-08 RX ORDER — OXYCODONE HYDROCHLORIDE 10 MG/1
10 TABLET ORAL 2 TIMES DAILY PRN
Qty: 60 TAB | Refills: 0 | Status: SHIPPED | OUTPATIENT
Start: 2019-04-13 | End: 2019-05-13

## 2019-04-08 ASSESSMENT — PAIN SCALES - GENERAL: PAINLEVEL: 6=MODERATE PAIN

## 2019-04-08 NOTE — PROGRESS NOTES
Chief Complaint   Patient presents with   • Back Pain     needs rx        HISTORY OF THE PRESENT ILLNESS: This is a 43 y.o. male established patient who presents today for follow up.    Acute right-sided low back pain with sciatica, sciatica laterality unspecified  Muscle spasm of back  Chronic. Patient states he is still having back pain. He is currently on oxycodone 10 mg. He denies any medication changes. No reports of significant medication side effects. States he has not yet been able to get in contact with the pain management specialist he was referred to. States they will try to call the pain specialist today. Patient states he has done massages previously which have helped with the pain. However, he reports doing a lot of bending over 1.5 weeks ago which caused the muscles on both sides of his back to tighten up again. States this pain goes to his bilateral hips as well. No increase in numbness, tingling, or nerve pain. States the gabapentin seems to be working well. He notes having an upcoming xray soon. He notes trying acupuncture on his shoulder in the past which did not help much.  States pain in 10/10.    Essential hypertension  Chronic. Patient's blood pressure is noted to be slightly elevated in clinic today. However, patient states his blood pressure has been fine overall. States he drinks a lot of coffee. Negative for chest pain, palpitations, shortness of breath, dizziness, headaches, or vision changes. States he has cut back on smoking.    Chronic pain recheck for: chronic low back pain, worse post-surgical.  Last dose of controlled substance: yesterday  Chronic pain treated with oxycodone taken twice a day    He  reports that he does not drink alcohol.  He  reports that he does not use drugs.    Interval history:   Any major change in health since last appointment? Yes more frequent bending at work.     Consequences of Chronic Opiate therapy:  (5 A's)  Analgesia: Compared to no treatment or prior  treatment, pain is currently improved  Activity: improved  Adverse Events: denies constipation, dry mouth, itchy skin, nausea and sedation  Aberrant Behaviors: He reports he is taking medication as prescribed and is not veering from agreed treatment regimen or provider recommendations. There have been no inappropriate refills or lost/stolen meds reported.  Affect/Mood: Pain is not impacting patient's mood.  Patient denies depression/anxiety.    Nonnarcotic treatments that are being used: Tylenol, muscle relaxers, Gabapentin, SSRI/SNRI, topical agents, ice and heat.     Last imaging: up to date    Opioid Risk Score: 6    Interpretation of Opioid Risk Score   Score 0-3 = Low risk of abuse. Do UDS at least once per year.  Score 4-7 = Moderate risk of abuse. Do UDS 1-4 times per year.  Score 8+ = High risk of abuse. Refer to specialist.    Last order of CONTROLLED SUBSTANCE TREATMENT AGREEMENT was found on 10/18/2017 from Office Visit on 10/18/2017     UDS Summary                URINE DRUG SCREEN Next Due 9/20/2019      Done 9/25/2018 [facility] PAIN MANAGEMENT SCREEN        Most recent UDS reviewed today and is consistent with prescribed medications.     I have reviewed the medical records, the Prescription Monitoring Program and I have determined that controlled substance treatment is medically indicated.        Past Medical History:   Diagnosis Date   • Elevated cholesterol    • Heart burn    • Hypertension    • Pain     right shoulder       Past Surgical History:   Procedure Laterality Date   • SHOULDER DECOMPRESSION ARTHROSCOPIC Right 11/29/2016    Procedure: SHOULDER DECOMPRESSION ARTHROSCOPIC - SUBACROMIAL W/LABRAL DEBRIDEMENT;  Surgeon: Linnette Freeman M.D.;  Location: SURGERY [facility];  Service:    • CLAVICLE DISTAL EXCISION  11/29/2016    Procedure: CLAVICLE DISTAL EXCISION;  Surgeon: Linnette Freeman M.D.;  Location: SURGERY [facility];  Service:    • APPENDECTOMY  1988       Family Status    Relation Status   • Mo Alive   • Fa Alive   • Bro Alive   • Bro Alive     Family History   Problem Relation Age of Onset   • Lung Disease Mother         COPD   • Arthritis Mother         RA   • Cancer Father         leukemia, prostate cancer   • No Known Problems Brother    • No Known Problems Brother        Social History   Substance Use Topics   • Smoking status: Current Every Day Smoker     Packs/day: 0.25     Years: 6.00     Types: Cigarettes   • Smokeless tobacco: Never Used      Comment: 3/ day   • Alcohol use No       Allergies: Shellfish allergy    Current Outpatient Prescriptions Ordered in [facility]   Medication Sig Dispense Refill   • [START ON 4/13/2019] oxyCODONE immediate release (ROXICODONE) 10 MG immediate release tablet Take 1 Tab by mouth 2 times a day as needed for Moderate Pain for up to 30 days. 60 Tab 0   • tizanidine (ZANAFLEX) 4 MG Tab Take 1 Tab by mouth every 6 hours as needed. 90 Tab 3   • traZODone (DESYREL) 100 MG Tab TAKE TWO TABLETS BY MOUTH AT BEDTIME AS NEEDED FOR SLEEP 180 Tab 3   • buPROPion SR (WELLBUTRIN-SR) 150 MG TABLET SR 12 HR sustained-release tablet TAKE 1 TABLET BY MOUTH ONCE DAILY FOR FIRST 3 DAYS THEN TAKE 1 TABLET TWICE DAILY THEREAFTER AS DIRECTED BY DR 90 Tab 3   • gabapentin (NEURONTIN) 600 MG tablet Take 1 Tab by mouth 3 times a day. 90 Tab 3   • losartan-hydrochlorothiazide (HYZAAR) 100-12.5 MG per tablet Take 1 Tab by mouth every day. 90 Tab 3   • albuterol 108 (90 Base) MCG/ACT Aero Soln inhalation aerosol Inhale 2 Puffs by mouth every 6 hours as needed. 8.5 g 3   • tadalafil (CIALIS) 10 MG tablet Take 1 Tab by mouth as needed for Erectile Dysfunction. 60 Tab 5   • Calcium Carbonate Antacid (TUMS PO) Take  by mouth as needed.       No current Epic-ordered facility-administered medications on file.        Review of Systems   Constitutional: Negative for fever, chills, weight loss and malaise/fatigue.   Respiratory: Negative for cough, sputum production, shortness of  "breath and wheezing.    Cardiovascular: Negative for chest pain, palpitations, orthopnea and leg swelling.   Musculoskeletal: Chronic back pain/muscle spasms.  Neurological: Negative for new numbness or tingling, new nerve pain, dizziness, tremors, sensory change, focal weakness and headaches.   All other systems reviewed and are negative except as in HPI.    Exam: /88   Pulse 74   Temp 36.6 °C (97.9 °F) (Temporal)   Resp 16   Ht 1.803 m (5' 10.98\")   Wt 97.5 kg (215 lb)   SpO2 96%   General:  Normal appearing. No distress.  Pulmonary:  Clear to ausculation.  Normal effort. No rales, ronchi, or wheezing.  Cardiovascular:  Regular rate and rhythm without murmur. Carotid and radial pulses are intact and equal bilaterally.  Neurologic:  Grossly nonfocal  Skin:  Warm and dry.  No obvious lesions.  Musculoskeletal:  Normal gait. No extremity cyanosis, clubbing, or edema. Definite muscle spasms to bilateral lower back, worst tenderness on the left, large knot of muscle fairly low on the back.  Psych:  Normal mood and affect. Alert and oriented x3. Judgment and insight is normal.    PLAN:    1. Acute right-sided low back pain with sciatica, sciatica laterality unspecified  - Refilling oxycodone which patient will take in the meantime until he sees pain management who will then manage this.  - Patient states he is still trying to get in contact with the pain specialist he was previously referred to. States he will try to call today again. Advised him to inform us if he is unable to reach them by the end of this week at which point we will give a new referral.   - Discussed other treatment options that we can try in the future such as acupuncture.  - oxyCODONE immediate release (ROXICODONE) 10 MG immediate release tablet; Take 1 Tab by mouth 2 times a day as needed for Moderate Pain for up to 30 days.  Dispense: 60 Tab; Refill: 0  - Consent for Opiate Prescription    2. Muscle spasm of back  - See 1 above  - " Consent for Opiate Prescription    3. Essential hypertension  - Well-controlled. Continue current regimen.  - Counseled patient on diet and exercise. Advised low sodium diet. Advised patient to monitor his blood pressure at home regularly    4. Chronic use of opiate drugs therapeutic purposes  - See 1 above  - Consent for Opiate Prescription     Follow-up in 1 month or sooner as needed. Patient is encouraged to be seen in the emergency room for chest pain, palpitations, shortness of breath, dizziness, severe abdominal pain or other concerning symptoms.     Please note that this dictation was created using voice recognition software. I have made every reasonable attempt to correct obvious errors, but I expect that there are errors of grammar and possibly content that I did not discover before finalizing the note.      Assessment/Plan  1. Acute right-sided low back pain with sciatica, sciatica laterality unspecified  oxyCODONE immediate release (ROXICODONE) 10 MG immediate release tablet    Consent for Opiate Prescription   2. Muscle spasm of back  Consent for Opiate Prescription   3. Essential hypertension     4. Chronic use of opiate drugs therapeutic purposes  Consent for Opiate Prescription         I have placed the below orders and discussed them with an approved delegating provider. The MA is performing the below orders under the direction of Dr. Laird.       Marino ADAN (Scribe), am scribing for, and in the presence of, DANIEL Ruvalcaba    Electronically signed by: Marino Mike (Scribe), 4/8/2019    Ash ADAN APRN personally performed the services described in this documentation, as scribed by Marino Mike in my presence, and it is both accurate and complete.

## 2019-04-08 NOTE — TELEPHONE ENCOUNTER
----- Message from Leo Manuel sent at 4/8/2019  2:10 PM PDT -----  Regarding: Non-Urgent Medical Question  Contact: [phone]  The pain management doctor I was referred to is apparently dragging Ho feet and isn’t taking new referrals so we’re gonna need to find a different doctor.

## 2019-04-13 ENCOUNTER — PATIENT MESSAGE (OUTPATIENT)
Dept: MEDICAL GROUP | Facility: PHYSICIAN GROUP | Age: 44
End: 2019-04-13

## 2019-04-16 ENCOUNTER — PATIENT MESSAGE (OUTPATIENT)
Dept: MEDICAL GROUP | Facility: PHYSICIAN GROUP | Age: 44
End: 2019-04-16

## 2019-05-06 ENCOUNTER — OFFICE VISIT (OUTPATIENT)
Dept: MEDICAL GROUP | Facility: PHYSICIAN GROUP | Age: 44
End: 2019-05-06
Payer: COMMERCIAL

## 2019-05-06 ENCOUNTER — TELEPHONE (OUTPATIENT)
Dept: MEDICAL GROUP | Facility: PHYSICIAN GROUP | Age: 44
End: 2019-05-06

## 2019-05-06 VITALS
SYSTOLIC BLOOD PRESSURE: 128 MMHG | HEART RATE: 80 BPM | OXYGEN SATURATION: 95 % | HEIGHT: 71 IN | TEMPERATURE: 98.1 F | RESPIRATION RATE: 16 BRPM | DIASTOLIC BLOOD PRESSURE: 88 MMHG | BODY MASS INDEX: 29.96 KG/M2 | WEIGHT: 214 LBS

## 2019-05-06 DIAGNOSIS — R06.83 SNORING: ICD-10-CM

## 2019-05-06 DIAGNOSIS — M54.40 ACUTE RIGHT-SIDED LOW BACK PAIN WITH SCIATICA, SCIATICA LATERALITY UNSPECIFIED: ICD-10-CM

## 2019-05-06 DIAGNOSIS — B35.1 TOENAIL FUNGUS: ICD-10-CM

## 2019-05-06 DIAGNOSIS — M62.830 MUSCLE SPASM OF BACK: ICD-10-CM

## 2019-05-06 PROCEDURE — 99214 OFFICE O/P EST MOD 30 MIN: CPT | Performed by: NURSE PRACTITIONER

## 2019-05-06 RX ORDER — KETOROLAC TROMETHAMINE 30 MG/ML
60 INJECTION, SOLUTION INTRAMUSCULAR; INTRAVENOUS ONCE
Status: COMPLETED | OUTPATIENT
Start: 2019-05-06 | End: 2019-05-06

## 2019-05-06 RX ORDER — KETOROLAC TROMETHAMINE 30 MG/ML
30 INJECTION, SOLUTION INTRAMUSCULAR; INTRAVENOUS ONCE
Qty: 1 ML | Refills: 0 | Status: SHIPPED | OUTPATIENT
Start: 2019-05-06 | End: 2019-05-06

## 2019-05-06 RX ADMIN — KETOROLAC TROMETHAMINE 60 MG: 30 INJECTION, SOLUTION INTRAMUSCULAR; INTRAVENOUS at 12:46

## 2019-05-06 NOTE — PROGRESS NOTES
"Chief Complaint   Patient presents with   • Back Pain     on going;        HISTORY OF THE PRESENT ILLNESS: This is a 43 y.o. male established patient who presents today for follow up on back pain.    Acute right-sided low back pain with sciatica, sciatica laterality unspecified  Muscle spasm of back  This is an ongoing problem. Patient continues to have lower back pain. States he feels most pain on the left side but has more muscle spasming on the right. He is currently taking roxicodone and does not need a refill yet, but he would like to get a toradol shot. He has appointment with pain management tomorrow. States he feeling a little improved overall, and his goal is to ultimately get off of the pain medication. However, he is still having pain. He notes having good days and bad days in terms of the pain. States he had severe muscle spasms on his back when he got up after eating dinner last night. Patient has been getting massages which provides some temporary alleviation. However, he is unhappy with the accumulating costs of massages that do not provide long-term relief. He does back stretches throughout the day which help slightly. However, he continues to have pain especially when standing still. Patient has history of post lumbar fusion and states the surgery has helped, in that he has regained sensation to his left leg.    Snoring  This is a new problem to me. Patient reports he has been having issues with snoring \"for a while\" now, but his wife recently told him that he needed to mentions this issue at this appointment. He thinks it is related to allergies and notes having year-round allergies. He has allergy symptoms despite getting kenalog injections in the past. Patient notes he will be seeing an allergist next month. Patient is wondering about getting further evaluation of his snoring.    Toenail fungus  This is a new problem. Patient reports having toenail fungus to his right big toe. He has tried " various over-the-counter treatments without improvement. He is thinking about trying Kerasal.       Past Medical History:   Diagnosis Date   • Elevated cholesterol    • Heart burn    • Hypertension    • Pain     right shoulder       Past Surgical History:   Procedure Laterality Date   • SHOULDER DECOMPRESSION ARTHROSCOPIC Right 11/29/2016    Procedure: SHOULDER DECOMPRESSION ARTHROSCOPIC - SUBACROMIAL W/LABRAL DEBRIDEMENT;  Surgeon: Linnette Freeman M.D.;  Location: SURGERY [facility];  Service:    • CLAVICLE DISTAL EXCISION  11/29/2016    Procedure: CLAVICLE DISTAL EXCISION;  Surgeon: Linnette Freeman M.D.;  Location: SURGERY [facility];  Service:    • APPENDECTOMY  1988       Family Status   Relation Status   • Mo Alive   • Fa Alive   • Bro Alive   • Bro Alive     Family History   Problem Relation Age of Onset   • Lung Disease Mother         COPD   • Arthritis Mother         RA   • Cancer Father         leukemia, prostate cancer   • No Known Problems Brother    • No Known Problems Brother        Social History   Substance Use Topics   • Smoking status: Current Every Day Smoker     Packs/day: 0.25     Years: 6.00     Types: Cigarettes   • Smokeless tobacco: Never Used      Comment: 3/ day   • Alcohol use No       Allergies: Shellfish allergy    Current Outpatient Prescriptions Ordered in [facility]   Medication Sig Dispense Refill   • ketorolac (TORADOL) 60 MG/2ML Solution 1 mL by Intramuscular route Once for 1 dose. 1 mL 0   • oxyCODONE immediate release (ROXICODONE) 10 MG immediate release tablet Take 1 Tab by mouth 2 times a day as needed for Moderate Pain for up to 30 days. 60 Tab 0   • tizanidine (ZANAFLEX) 4 MG Tab Take 1 Tab by mouth every 6 hours as needed. 90 Tab 3   • traZODone (DESYREL) 100 MG Tab TAKE TWO TABLETS BY MOUTH AT BEDTIME AS NEEDED FOR SLEEP 180 Tab 3   • buPROPion SR (WELLBUTRIN-SR) 150 MG TABLET SR 12 HR sustained-release tablet TAKE 1 TABLET BY MOUTH ONCE DAILY FOR FIRST 3 DAYS  "THEN TAKE 1 TABLET TWICE DAILY THEREAFTER AS DIRECTED BY  90 Tab 3   • gabapentin (NEURONTIN) 600 MG tablet Take 1 Tab by mouth 3 times a day. 90 Tab 3   • losartan-hydrochlorothiazide (HYZAAR) 100-12.5 MG per tablet Take 1 Tab by mouth every day. 90 Tab 3   • albuterol 108 (90 Base) MCG/ACT Aero Soln inhalation aerosol Inhale 2 Puffs by mouth every 6 hours as needed. 8.5 g 3   • tadalafil (CIALIS) 10 MG tablet Take 1 Tab by mouth as needed for Erectile Dysfunction. 60 Tab 5   • Calcium Carbonate Antacid (TUMS PO) Take  by mouth as needed.       No current Epic-ordered facility-administered medications on file.        Review of Systems   Constitutional: Negative for fever, chills, weight loss and malaise/fatigue.   Respiratory: Snoring, allergy symptoms. Negative for cough, sputum production, shortness of breath and wheezing.    Musculoskeletal: Chronic lower back pain and muscle spasms.  Neurological: Negative for dizziness, tingling, tremors, sensory change, focal weakness and headaches.     Skin: Toenail fungus on right big toe.  All other systems reviewed and are negative except as in HPI.    Exam: /88 (BP Location: Left arm, Patient Position: Sitting, BP Cuff Size: Adult)   Pulse 80   Temp 36.7 °C (98.1 °F) (Temporal)   Resp 16   Ht 1.803 m (5' 10.98\")   Wt 97.1 kg (214 lb)   SpO2 95%   General:  Normal appearing. No distress.  Pulmonary:  Clear to ausculation.  Normal effort. No rales, ronchi, or wheezing.  Cardiovascular:  Regular rate and rhythm without murmur. Carotid and radial pulses are intact and equal bilaterally.  Neurologic:  Grossly nonfocal  Skin:  Warm and dry.  No obvious lesions.  Musculoskeletal:  Normal gait. No extremity cyanosis, clubbing, or edema. Tenderness to palpation of lower back, muscle spasming on right paraspinous muscles and lumbar spine, some muscle spasms to left thoracic area.  Psych:  Normal mood and affect. Alert and oriented x3. Judgment and insight is " normal.    PLAN:    1. Acute right-sided low back pain with sciatica, sciatica laterality unspecified  2. Muscle spasm of back  - Patient is not due for pain medication refill. Patient still has lower back pain but states his pain is overall improving, and he ultimately wants to get off of the pain medication. He will continue with pain management appointment tomorrow.   - He requests for toradol injection in the meantime due to ongoing pain.  - ketorolac (TORADOL) 60 MG/2ML Solution; 1 mL by Intramuscular route Once for 1 dose.  Dispense: 1 mL; Refill: 0    3. Snoring  - Patient reports having an ongoing issue with snoring. He agrees to get sleep study done.  - REFERRAL TO SLEEP STUDIES    4. Toenail fungus  - Patient states he will try Kerasal. Will refer him to podiatry in the future if symptoms do not improve. Supportive care instructions and return precautions given.     Follow-up in 1 month or sooner as needed. Patient is encouraged to be seen in the emergency room for chest pain, palpitations, shortness of breath, dizziness, severe abdominal pain or other concerning symptoms.     Please note that this dictation was created using voice recognition software. I have made every reasonable attempt to correct obvious errors, but I expect that there are errors of grammar and possibly content that I did not discover before finalizing the note.      Assessment/Plan  1. Acute right-sided low back pain with sciatica, sciatica laterality unspecified  ketorolac (TORADOL) 60 MG/2ML Solution   2. Muscle spasm of back     3. Snoring  REFERRAL TO SLEEP STUDIES   4. Toenail fungus           I have placed the below orders and discussed them with an approved delegating provider. The MA is performing the below orders under the direction of Dr. Laird.       I, Marino Mike (Scribe), am scribing for, and in the presence of, DANIEL Ruvalcaba    Electronically signed by: Marino Mike (Scribe),  5/6/2019    IAsh APRN personally performed the services described in this documentation, as scribed by Marino Mike in my presence, and it is both accurate and complete.

## 2019-06-03 DIAGNOSIS — N52.9 ERECTILE DYSFUNCTION, UNSPECIFIED ERECTILE DYSFUNCTION TYPE: ICD-10-CM

## 2019-06-03 RX ORDER — TADALAFIL 10 MG/1
10 TABLET ORAL PRN
Qty: 60 TAB | Refills: 0 | Status: SHIPPED
Start: 2019-06-03 | End: 2020-10-05 | Stop reason: SDUPTHER

## 2019-06-03 NOTE — TELEPHONE ENCOUNTER
----- Message from Leo Manuel sent at 6/1/2019  7:52 PM PDT -----  Regarding: Prescription Question  Contact: [phone]  Could you please write me a refill for my cialis? I’ll need it in paper form so I can upload it to the [facility]

## 2019-06-24 ENCOUNTER — OFFICE VISIT (OUTPATIENT)
Dept: MEDICAL GROUP | Facility: PHYSICIAN GROUP | Age: 44
End: 2019-06-24
Payer: COMMERCIAL

## 2019-06-24 VITALS
DIASTOLIC BLOOD PRESSURE: 90 MMHG | TEMPERATURE: 98.1 F | HEART RATE: 83 BPM | WEIGHT: 211 LBS | BODY MASS INDEX: 29.54 KG/M2 | SYSTOLIC BLOOD PRESSURE: 128 MMHG | OXYGEN SATURATION: 96 % | RESPIRATION RATE: 16 BRPM | HEIGHT: 71 IN

## 2019-06-24 DIAGNOSIS — F51.01 PRIMARY INSOMNIA: ICD-10-CM

## 2019-06-24 DIAGNOSIS — R06.83 SNORING: ICD-10-CM

## 2019-06-24 DIAGNOSIS — R53.82 CHRONIC FATIGUE: ICD-10-CM

## 2019-06-24 DIAGNOSIS — M25.511 RIGHT SHOULDER PAIN, UNSPECIFIED CHRONICITY: ICD-10-CM

## 2019-06-24 DIAGNOSIS — I10 ESSENTIAL HYPERTENSION: ICD-10-CM

## 2019-06-24 DIAGNOSIS — M54.40 ACUTE RIGHT-SIDED LOW BACK PAIN WITH SCIATICA, SCIATICA LATERALITY UNSPECIFIED: ICD-10-CM

## 2019-06-24 PROCEDURE — 99214 OFFICE O/P EST MOD 30 MIN: CPT | Performed by: NURSE PRACTITIONER

## 2019-06-24 RX ORDER — OXYCODONE 27 MG/1
CAPSULE, EXTENDED RELEASE ORAL
COMMUNITY
Start: 2019-06-11 | End: 2020-11-05

## 2019-06-24 RX ORDER — BACLOFEN 10 MG/1
TABLET ORAL
COMMUNITY
Start: 2019-06-10 | End: 2020-11-05

## 2019-06-24 RX ORDER — OXYCODONE HYDROCHLORIDE 10 MG/1
TABLET ORAL
COMMUNITY
Start: 2019-06-10

## 2019-06-24 NOTE — PROGRESS NOTES
Chief Complaint   Patient presents with   • Tired     x 3 weeks; ,Mid day napping        HISTORY OF THE PRESENT ILLNESS: This is a 43 y.o. male established patient who presents today with an acute complaint of fatigue onset 3 days ago. He states he has been taking mid-day naps since onset, which is unusual for him, however he has been working a lot. He reports working 120 hours in the last 2 weeks. He also thinks the Baclofen makes him tired. He notes that he has missed a dose a few times, so he doubles up the next time he's supposed to take it. He has a history of snoring, but has never had a sleep study. He notes insomnia due to the drastic change in his work schedule. He comments that he is concerned, because when his dad was diagnosed with leukemia, his first symptoms was fatigue. The patient denies swollen lymph nodes.     The patient has a chronic history of hypertension, and is taking losartan-HCTZ 100-12.5 mg daily. His blood pressure today is slightly elevated at 128/90, likely due to his tiredness and chronic pain.     The patient states his pain is well controlled on oxycodone 10 mg and Xtampza 27 mg twice daily. The oxycodone is immediate release pills, and the Xtampza is extended release, and are meant to control his pain all day long. He is having injections for his pain on 6/27/19.     The patient saw an allergist, and is supposed to start immunotherapy soon. He was tested for 71 allergens, and reacted to 30+.     No problem-specific Assessment & Plan notes found for this encounter.      Past Medical History:   Diagnosis Date   • Elevated cholesterol    • Heart burn    • Hypertension    • Pain     right shoulder       Past Surgical History:   Procedure Laterality Date   • SHOULDER DECOMPRESSION ARTHROSCOPIC Right 11/29/2016    Procedure: SHOULDER DECOMPRESSION ARTHROSCOPIC - SUBACROMIAL W/LABRAL DEBRIDEMENT;  Surgeon: Linnette Freeman M.D.;  Location: SURGERY [facility];  Service:    •  CLAVICLE DISTAL EXCISION  11/29/2016    Procedure: CLAVICLE DISTAL EXCISION;  Surgeon: Linnette Freeman M.D.;  Location: SURGERY [facility];  Service:    • APPENDECTOMY  1988       Family Status   Relation Status   • Mo Alive   • Fa Alive   • Bro Alive   • Bro Alive     Family History   Problem Relation Age of Onset   • Lung Disease Mother         COPD   • Arthritis Mother         RA   • Cancer Father         leukemia, prostate cancer   • No Known Problems Brother    • No Known Problems Brother        Social History   Substance Use Topics   • Smoking status: Current Every Day Smoker     Packs/day: 0.25     Years: 6.00     Types: Cigarettes   • Smokeless tobacco: Never Used      Comment: 3/ day   • Alcohol use No       Allergies: Shellfish allergy    Current Outpatient Prescriptions Ordered in [facility]   Medication Sig Dispense Refill   • oxyCODONE immediate release (ROXICODONE) 10 MG immediate release tablet      • XTAMPZA ER 27 MG Capsule Extended Release 12 hour Abuse-Deterrent      • baclofen (LIORESAL) 10 MG Tab      • tadalafil (CIALIS) 10 MG tablet Take 1 Tab by mouth as needed for Erectile Dysfunction. 60 Tab 0   • tizanidine (ZANAFLEX) 4 MG Tab Take 1 Tab by mouth every 6 hours as needed. (Patient not taking: Reported on 6/24/2019) 90 Tab 3   • traZODone (DESYREL) 100 MG Tab TAKE TWO TABLETS BY MOUTH AT BEDTIME AS NEEDED FOR SLEEP 180 Tab 3   • buPROPion SR (WELLBUTRIN-SR) 150 MG TABLET SR 12 HR sustained-release tablet TAKE 1 TABLET BY MOUTH ONCE DAILY FOR FIRST 3 DAYS THEN TAKE 1 TABLET TWICE DAILY THEREAFTER AS DIRECTED BY DR 90 Tab 3   • gabapentin (NEURONTIN) 600 MG tablet Take 1 Tab by mouth 3 times a day. 90 Tab 3   • losartan-hydrochlorothiazide (HYZAAR) 100-12.5 MG per tablet Take 1 Tab by mouth every day. 90 Tab 3   • albuterol 108 (90 Base) MCG/ACT Aero Soln inhalation aerosol Inhale 2 Puffs by mouth every 6 hours as needed. 8.5 g 3   • Calcium Carbonate Antacid (TUMS PO) Take  by mouth as  "needed.       No current Epic-ordered facility-administered medications on file.        Review of Systems   Constitutional: Positive for fatigue. Negative for fever, chills, weight loss and malaise.   HENT: Negative for ear pain, nosebleeds, congestion, sore throat and neck pain.    Eyes: Negative for blurred vision.   Respiratory: Negative for cough, sputum production, shortness of breath and wheezing.    Cardiovascular: Negative for chest pain, palpitations, orthopnea and leg swelling.   Gastrointestinal: Negative for heartburn, nausea, vomiting and abdominal pain.   Genitourinary: Negative for dysuria, urgency and frequency.   Musculoskeletal: Negative for myalgias, back pain and joint pain.   Skin: Negative for rash and itching.   Neurological: Negative for dizziness, tingling, tremors, sensory change, focal weakness and headaches.   Endo/Heme/Allergies: Does not bruise/bleed easily. No lymphadenopathy.   Psychiatric/Behavioral: Negative for depression, anxiety, or memory loss.     All other systems reviewed and are negative except as in HPI.    Exam: /90 (BP Location: Left arm, Patient Position: Sitting, BP Cuff Size: Adult)   Pulse 83   Temp 36.7 °C (98.1 °F) (Temporal)   Resp 16   Ht 1.803 m (5' 10.98\")   Wt 95.7 kg (211 lb)   SpO2 96%   General:  Normal appearing. No distress.  Pulmonary:  Clear to ausculation.  Normal effort. No rales, ronchi, or wheezing.  Cardiovascular:  Regular rate and rhythm without murmur. Carotid and radial pulses are intact and equal bilaterally.  Neurologic:  Grossly nonfocal  Lymph: No cervical, supraclavicular or axillary lymph nodes are palpable  Skin:  Warm and dry.  No obvious lesions.  Musculoskeletal:  Normal gait. No extremity cyanosis, clubbing, or edema.  Psych:  Normal mood and affect. Alert and oriented x3. Judgment and insight is normal.    PLAN:    1. Essential hypertension  Chronic, stable. Continue taking losartan-HCTZ daily.     2. Right shoulder pain, " unspecified chronicity  Chronic, stable on oxycodone and Xtampza. Continue taking as directed.    3. Acute right-sided low back pain with sciatica, sciatica laterality unspecified  Stable on oxycodone and Xtampza. Continue taking as directed.    4. Primary insomnia  Improved, sleeping much better.     5. Snoring  I have previously referred him for a sleep study. I will give him the office's contact information and he will call to schedule and appointment.     6. Chronic fatigue  I have ordered labs to further evaluate his fatigue.   - VITAMIN B12; Future  - VITAMIN D,25 HYDROXY; Future  - CBC WITH DIFFERENTIAL; Future    Follow-up based on lab results. Patient is encouraged to be seen in the emergency room for chest pain, palpitations, shortness of breath, dizziness, severe abdominal pain or other concerning symptoms.      Please note that this dictation was created using voice recognition software. I have made every reasonable attempt to correct obvious errors, but I expect that there are errors of grammar and possibly content that I did not discover before finalizing the note.      Assessment/Plan  1. Essential hypertension     2. Right shoulder pain, unspecified chronicity     3. Acute right-sided low back pain with sciatica, sciatica laterality unspecified     4. Primary insomnia     5. Snoring     6. Chronic fatigue  VITAMIN B12    VITAMIN D,25 HYDROXY    CBC WITH DIFFERENTIAL         I have placed the below orders and discussed them with an approved delegating provider. The MA is performing the below orders under the direction of Dr. Macedo.       Henny ADAN (Scribe), am scribing for, and in the presence of, DANIEL Ruvalcaba    Electronically signed by: Henny Stout (Jessica), 6/24/2019    Ash ADAN APRN personally performed the services described in this documentation, as scribed by Henny Stout in my presence, and it is both accurate and complete.

## 2019-06-25 DIAGNOSIS — M54.40 ACUTE RIGHT-SIDED LOW BACK PAIN WITH SCIATICA, SCIATICA LATERALITY UNSPECIFIED: ICD-10-CM

## 2019-06-25 NOTE — TELEPHONE ENCOUNTER
----- Message from Leo Manuel sent at 6/25/2019 12:06 PM PDT -----  Regarding: Prescription Question  Contact: [phone]  Can you plz send a refill for my gabapentin to my pharmacy please?

## 2019-06-26 RX ORDER — GABAPENTIN 600 MG/1
600 TABLET ORAL 3 TIMES DAILY
Qty: 90 TAB | Refills: 0 | Status: SHIPPED | OUTPATIENT
Start: 2019-06-26 | End: 2019-07-30 | Stop reason: SDUPTHER

## 2019-07-30 DIAGNOSIS — M54.40 ACUTE RIGHT-SIDED LOW BACK PAIN WITH SCIATICA, SCIATICA LATERALITY UNSPECIFIED: ICD-10-CM

## 2019-07-30 NOTE — TELEPHONE ENCOUNTER
----- Message from Leo Manuel sent at 7/30/2019 12:14 PM PDT -----  Regarding: Prescription Question  Contact: [phone]  Can you please send in a refill for my gabapentin to my pharmacy

## 2019-07-31 RX ORDER — GABAPENTIN 600 MG/1
600 TABLET ORAL 3 TIMES DAILY
Qty: 90 TAB | Refills: 0 | OUTPATIENT
Start: 2019-07-31

## 2019-07-31 RX ORDER — GABAPENTIN 600 MG/1
TABLET ORAL
Qty: 90 TAB | Refills: 0 | Status: SHIPPED | OUTPATIENT
Start: 2019-07-31 | End: 2019-09-03 | Stop reason: SDUPTHER

## 2019-08-16 LAB
25(OH)D3+25(OH)D2 SERPL-MCNC: 38.3 NG/ML (ref 30–100)
BASOPHILS # BLD AUTO: 0.1 X10E3/UL (ref 0–0.2)
BASOPHILS NFR BLD AUTO: 1 %
EOSINOPHIL # BLD AUTO: 0.1 X10E3/UL (ref 0–0.4)
EOSINOPHIL NFR BLD AUTO: 1 %
ERYTHROCYTE [DISTWIDTH] IN BLOOD BY AUTOMATED COUNT: 13.3 % (ref 12.3–15.4)
HCT VFR BLD AUTO: 46.2 % (ref 37.5–51)
HGB BLD-MCNC: 15.8 G/DL (ref 13–17.7)
IMM GRANULOCYTES # BLD AUTO: 0 X10E3/UL (ref 0–0.1)
IMM GRANULOCYTES NFR BLD AUTO: 0 %
IMMATURE CELLS  115398: NORMAL
LYMPHOCYTES # BLD AUTO: 2.3 X10E3/UL (ref 0.7–3.1)
LYMPHOCYTES NFR BLD AUTO: 24 %
MCH RBC QN AUTO: 30.7 PG (ref 26.6–33)
MCHC RBC AUTO-ENTMCNC: 34.2 G/DL (ref 31.5–35.7)
MCV RBC AUTO: 90 FL (ref 79–97)
MONOCYTES # BLD AUTO: 0.5 X10E3/UL (ref 0.1–0.9)
MONOCYTES NFR BLD AUTO: 5 %
MORPHOLOGY BLD-IMP: NORMAL
NEUTROPHILS # BLD AUTO: 6.8 X10E3/UL (ref 1.4–7)
NEUTROPHILS NFR BLD AUTO: 69 %
NRBC BLD AUTO-RTO: NORMAL %
PLATELET # BLD AUTO: 307 X10E3/UL (ref 150–450)
RBC # BLD AUTO: 5.14 X10E6/UL (ref 4.14–5.8)
VIT B12 SERPL-MCNC: 912 PG/ML (ref 232–1245)
WBC # BLD AUTO: 9.8 X10E3/UL (ref 3.4–10.8)

## 2019-09-03 DIAGNOSIS — M54.40 ACUTE RIGHT-SIDED LOW BACK PAIN WITH SCIATICA, SCIATICA LATERALITY UNSPECIFIED: ICD-10-CM

## 2019-09-04 RX ORDER — GABAPENTIN 600 MG/1
TABLET ORAL
Qty: 90 TAB | Refills: 0 | Status: SHIPPED | OUTPATIENT
Start: 2019-09-04 | End: 2020-11-05

## 2019-10-02 ENCOUNTER — APPOINTMENT (OUTPATIENT)
Dept: MEDICAL GROUP | Facility: PHYSICIAN GROUP | Age: 44
End: 2019-10-02
Payer: COMMERCIAL

## 2019-10-06 DIAGNOSIS — I10 ESSENTIAL HYPERTENSION: ICD-10-CM

## 2019-10-07 RX ORDER — LOSARTAN POTASSIUM AND HYDROCHLOROTHIAZIDE 12.5; 1 MG/1; MG/1
TABLET ORAL
Qty: 90 TAB | Refills: 0 | Status: SHIPPED | OUTPATIENT
Start: 2019-10-07 | End: 2021-09-07

## 2019-10-17 ENCOUNTER — OFFICE VISIT (OUTPATIENT)
Dept: MEDICAL GROUP | Facility: PHYSICIAN GROUP | Age: 44
End: 2019-10-17
Payer: COMMERCIAL

## 2019-10-17 VITALS
DIASTOLIC BLOOD PRESSURE: 88 MMHG | SYSTOLIC BLOOD PRESSURE: 130 MMHG | HEIGHT: 70 IN | RESPIRATION RATE: 16 BRPM | OXYGEN SATURATION: 94 % | HEART RATE: 90 BPM | WEIGHT: 209.6 LBS | TEMPERATURE: 98.9 F | BODY MASS INDEX: 30.01 KG/M2

## 2019-10-17 DIAGNOSIS — I10 ESSENTIAL HYPERTENSION: ICD-10-CM

## 2019-10-17 DIAGNOSIS — G43.001 MIGRAINE WITHOUT AURA AND WITH STATUS MIGRAINOSUS, NOT INTRACTABLE: ICD-10-CM

## 2019-10-17 DIAGNOSIS — Z23 NEED FOR VACCINATION: ICD-10-CM

## 2019-10-17 PROCEDURE — 99214 OFFICE O/P EST MOD 30 MIN: CPT | Mod: 25 | Performed by: NURSE PRACTITIONER

## 2019-10-17 PROCEDURE — 90471 IMMUNIZATION ADMIN: CPT | Performed by: NURSE PRACTITIONER

## 2019-10-17 PROCEDURE — 90686 IIV4 VACC NO PRSV 0.5 ML IM: CPT | Performed by: NURSE PRACTITIONER

## 2019-10-17 RX ORDER — SUMATRIPTAN 50 MG/1
50 TABLET, FILM COATED ORAL
Qty: 10 TAB | Refills: 3 | Status: SHIPPED | OUTPATIENT
Start: 2019-10-17 | End: 2020-11-05

## 2019-10-17 RX ORDER — KETOROLAC TROMETHAMINE 30 MG/ML
60 INJECTION, SOLUTION INTRAMUSCULAR; INTRAVENOUS ONCE
Status: COMPLETED | OUTPATIENT
Start: 2019-10-17 | End: 2019-10-17

## 2019-10-17 RX ORDER — METHYLPREDNISOLONE 4 MG/1
TABLET ORAL
COMMUNITY
Start: 2019-08-06 | End: 2020-11-05

## 2019-10-17 RX ADMIN — KETOROLAC TROMETHAMINE 60 MG: 30 INJECTION, SOLUTION INTRAMUSCULAR; INTRAVENOUS at 10:14

## 2019-10-17 NOTE — PROGRESS NOTES
Chief Complaint   Patient presents with   • Headache     3-4 times everyday 1+ mo        HISTORY OF THE PRESENT ILLNESS: This is a 44 y.o. male established patient who presents today for follow up.    Essential hypertension  Patient is taking losartan-HCTZ 100-12.5 mg which he tolerates well without side effects. His pressures today in office are 130/88. Denies chest pain, palpitations, headaches, blurry vision.     Migraine without aura and with status migrainosus, not intractable  Chronic. Patient has been having headaches for the last month, claiming he has been getting 3-4 headaches which he believes are tension headaches and are most present just behind his eyes. He describes the pain as a throbbing, and has had some episodes of nausea associated with the headaches, but denies any auras. Patient follows a pain specialist who had him try Trokendi, but he states that this only lasts approximately 2 hours and then the headache will return. He denies any vision changes, head injuries or ear ringing. Patient notes that he would be comfortable starting medications to help treat this, such as Imitrex.    Need for vaccination  Patient agrees to receive his influenza vaccination today in office after discussing the risks and benefits.      Past Medical History:   Diagnosis Date   • Elevated cholesterol    • Heart burn    • Hypertension    • Pain     right shoulder       Past Surgical History:   Procedure Laterality Date   • SHOULDER DECOMPRESSION ARTHROSCOPIC Right 11/29/2016    Procedure: SHOULDER DECOMPRESSION ARTHROSCOPIC - SUBACROMIAL W/LABRAL DEBRIDEMENT;  Surgeon: Linnette Freeman M.D.;  Location: SURGERY [facility];  Service:    • CLAVICLE DISTAL EXCISION  11/29/2016    Procedure: CLAVICLE DISTAL EXCISION;  Surgeon: Linnette Freeman M.D.;  Location: SURGERY [facility];  Service:    • APPENDECTOMY  1988       Family Status   Relation Name Status   • Mo  Alive   • Fa  Alive   • Bro  Alive   • Bro   Alive     Family History   Problem Relation Age of Onset   • Lung Disease Mother         COPD   • Arthritis Mother         RA   • Cancer Father         leukemia, prostate cancer   • No Known Problems Brother    • No Known Problems Brother        Social History     Tobacco Use   • Smoking status: Former Smoker     Packs/day: 0.25     Years: 6.00     Pack years: 1.50     Types: Cigarettes     Last attempt to quit: 2019     Years since quittin.2   • Smokeless tobacco: Never Used   • Tobacco comment: 3/ day   Substance Use Topics   • Alcohol use: No     Alcohol/week: 0.0 oz   • Drug use: No     Comment: methamphetamine x 2 years stopped        Allergies: Shellfish allergy    Current Outpatient Medications Ordered in Epic   Medication Sig Dispense Refill   • losartan-hydrochlorothiazide (HYZAAR) 100-12.5 MG per tablet TAKE ONE TABLET BY MOUTH ONE TIME DAILY 90 Tab 0   • gabapentin (NEURONTIN) 600 MG tablet TAKE ONE TABLET BY MOUTH THREE TIMES DAILY 90 Tab 0   • oxyCODONE immediate release (ROXICODONE) 10 MG immediate release tablet      • XTAMPZA ER 27 MG Capsule Extended Release 12 hour Abuse-Deterrent      • baclofen (LIORESAL) 10 MG Tab      • tadalafil (CIALIS) 10 MG tablet Take 1 Tab by mouth as needed for Erectile Dysfunction. 60 Tab 0   • traZODone (DESYREL) 100 MG Tab TAKE TWO TABLETS BY MOUTH AT BEDTIME AS NEEDED FOR SLEEP 180 Tab 3   • buPROPion SR (WELLBUTRIN-SR) 150 MG TABLET SR 12 HR sustained-release tablet TAKE 1 TABLET BY MOUTH ONCE DAILY FOR FIRST 3 DAYS THEN TAKE 1 TABLET TWICE DAILY THEREAFTER AS DIRECTED BY  90 Tab 3   • albuterol 108 (90 Base) MCG/ACT Aero Soln inhalation aerosol Inhale 2 Puffs by mouth every 6 hours as needed. 8.5 g 3   • Calcium Carbonate Antacid (TUMS PO) Take  by mouth as needed.     • methylPREDNISolone (MEDROL DOSEPAK) 4 MG Tablet Therapy Pack      • tizanidine (ZANAFLEX) 4 MG Tab Take 1 Tab by mouth every 6 hours as needed. (Patient not taking: Reported on  "6/24/2019) 90 Tab 3     No current Epic-ordered facility-administered medications on file.        Review of Systems   HENT: Negative for ear ringining or head injuries  Eyes: Negative for vision changes  Gastrointestinal: Positive for nausea.   Neurological: Positive for headaches, negative for auras.  All other systems reviewed and are negative except as in HPI.    Exam: /88 (BP Location: Right arm, Patient Position: Sitting, BP Cuff Size: Adult)   Pulse 90   Temp 37.2 °C (98.9 °F) (Temporal)   Resp 16   Ht 1.778 m (5' 10\")   Wt 95.1 kg (209 lb 9.6 oz)   SpO2 94%   General:  Normal appearing. No distress.  Pulmonary:  Clear to ausculation.  Normal effort. No rales, ronchi, or wheezing.  Cardiovascular:  Regular rate and rhythm without murmur. Carotid and radial pulses are intact and equal bilaterally.  Neurologic:  Grossly nonfocal. CN 2-12.  Skin:  Warm and dry.  No obvious lesions.  Musculoskeletal:  Normal gait. No extremity cyanosis, clubbing, or edema.  Psych:  Normal mood and affect. Alert and oriented x3. Judgment and insight is normal.      PLAN:    1. Essential hypertension  - Under good control. Continue same regimen of losartan HCTZ.    2. Migraine without aura and with status migrainosus, not intractable  - Patient will start Imitrex to attempt to treat headaches. He also is comfortable with receiving a toradol injection today in office to help with his pain.  - SUMAtriptan (IMITREX) 50 MG Tab; Take 1 Tab by mouth Once PRN for Migraine for up to 1 dose.  Dispense: 10 Tab; Refill: 3  - ketorolac (TORADOL) injection 60 mg    3. Need for vaccination  - Vaccine was given today after reviewing risks and benefits as well as side effects of vaccine.  - Influenza Vaccine Quad Injection (PF)    4. BMI 30.0-30.9,adult  - Patient identified as having weight management issue.  Appropriate orders and counseling given.    Follow-up in 1 week or sooner as needed. Patient is encouraged to be seen in the " emergency room for chest pain, palpitations, shortness of breath, dizziness, severe abdominal pain or other concerning symptoms.    Please note that this dictation was created using voice recognition software. I have made every reasonable attempt to correct obvious errors, but I expect that there are errors of grammar and possibly content that I did not discover before finalizing the note.      Assessment/Plan  1. Essential hypertension     2. Migraine without aura and with status migrainosus, not intractable           I have placed the below orders and discussed them with an approved delegating provider. The MA is performing the below orders under the direction of Dr. Laird.      Tee ADAN (Scribe), am scribing for, and in the presence of, DANIEL Ruvalcaba    Electronically signed by: Tee Gabriel (Scribe), 10/17/2019    Ash ADAN APRN personally performed the services described in this documentation, as scribed by Tee Gabriel in my presence, and it is both accurate and complete.

## 2019-10-21 ENCOUNTER — PATIENT MESSAGE (OUTPATIENT)
Dept: MEDICAL GROUP | Facility: PHYSICIAN GROUP | Age: 44
End: 2019-10-21

## 2019-10-21 DIAGNOSIS — G43.001 MIGRAINE WITHOUT AURA AND WITH STATUS MIGRAINOSUS, NOT INTRACTABLE: ICD-10-CM

## 2019-10-21 RX ORDER — BUTALBITAL, ACETAMINOPHEN AND CAFFEINE 50; 325; 40 MG/1; MG/1; MG/1
1 TABLET ORAL EVERY 8 HOURS PRN
Qty: 15 TAB | Refills: 0 | Status: SHIPPED | OUTPATIENT
Start: 2019-10-21 | End: 2019-10-23 | Stop reason: SDUPTHER

## 2019-10-29 ENCOUNTER — OFFICE VISIT (OUTPATIENT)
Dept: MEDICAL GROUP | Facility: PHYSICIAN GROUP | Age: 44
End: 2019-10-29
Payer: COMMERCIAL

## 2019-10-29 VITALS
WEIGHT: 209 LBS | HEIGHT: 70 IN | HEART RATE: 88 BPM | SYSTOLIC BLOOD PRESSURE: 124 MMHG | TEMPERATURE: 98 F | RESPIRATION RATE: 18 BRPM | BODY MASS INDEX: 29.92 KG/M2 | OXYGEN SATURATION: 95 % | DIASTOLIC BLOOD PRESSURE: 84 MMHG

## 2019-10-29 DIAGNOSIS — Z91.09 ENVIRONMENTAL ALLERGIES: ICD-10-CM

## 2019-10-29 DIAGNOSIS — G44.52 NEW DAILY PERSISTENT HEADACHE: ICD-10-CM

## 2019-10-29 DIAGNOSIS — I10 ESSENTIAL HYPERTENSION: ICD-10-CM

## 2019-10-29 DIAGNOSIS — G43.001 MIGRAINE WITHOUT AURA AND WITH STATUS MIGRAINOSUS, NOT INTRACTABLE: ICD-10-CM

## 2019-10-29 DIAGNOSIS — M54.40 ACUTE RIGHT-SIDED LOW BACK PAIN WITH SCIATICA, SCIATICA LATERALITY UNSPECIFIED: ICD-10-CM
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
## 2019-10-29 PROCEDURE — 99214 OFFICE O/P EST MOD 30 MIN: CPT | Performed by: NURSE PRACTITIONER

## 2019-10-30 NOTE — ASSESSMENT & PLAN NOTE
Chronic in nature. Stable. Blood pressure 124/84 today in office. Patient takes his blood pressure medication every morning. Patient denies chest pain, palpitations, dizziness or blurry vision.

## 2019-10-30 NOTE — PROGRESS NOTES
Chief Complaint   Patient presents with   • Headache     a little better; rx helping when really needed        HISTORY OF PRESENT ILLNESS: Patient is a 44 y.o. male established patient who presents today to discuss his consistent headache.    Hypertension  Chronic in nature. Stable. Blood pressure 124/84 today in office. Patient takes his blood pressure medication every morning. Patient denies chest pain, palpitations, dizziness or blurry vision.    Environmental allergies  Chronic in nature. Stable. Takes OTC Xyzal and this works well with his allergies.    Right-sided low back pain with sciatica  Chronic in nature. Currently taking pain medication which pain management is following. Patient states that is pain is still an 8-9 out of 10. States that he feels it most in the mornings when he first wakes up. States that keeping active and moving through out the day helps with the pain.    Migraine without aura and with status migrainosus, not intractable  Patient has been having a constant headache for the past 2 weeks. Patient states the the Fiorcet helped relieve the headache but he did not like the way it made him feel. States the pain is an 8-9 out of 10 when it is at its worse, but can get it down to a 2-3 out of 10 with medication. Pain management started him on Topamax but he did not feel like this did anything for him. Patient states he has some stress in his life, but does not believe this is making his headaches worse.      Patient Active Problem List    Diagnosis Date Noted   • Migraine without aura and with status migrainosus, not intractable 10/17/2019   • Anxiety 02/11/2019   • Chronic use of opiate drugs therapeutic purposes 09/25/2018   • Nicotine addiction 10/18/2017   • Chronic pain of right knee 10/18/2017   • Other chronic pain 10/18/2017   • Environmental allergies 06/07/2017   • Right-sided low back pain with sciatica 06/07/2017   • ED (erectile dysfunction) 06/07/2017   • Primary insomnia  03/01/2017   • Obesity (BMI 30-39.9) 03/01/2017   • Right shoulder pain 11/29/2016   • Hypertension 08/19/2016   • Bursitis of right shoulder 08/19/2016       Allergies:Shellfish allergy    Current Outpatient Medications   Medication Sig Dispense Refill   • acetaminophen/caffeine/butalbital 325-40-50 mg (FIORICET) -40 MG Tab Take 1 Tab by mouth every 8 hours as needed for Headache or Migraine for up to 15 days. 15 Tab 0   • methylPREDNISolone (MEDROL DOSEPAK) 4 MG Tablet Therapy Pack      • SUMAtriptan (IMITREX) 50 MG Tab Take 1 Tab by mouth Once PRN for Migraine for up to 1 dose. 10 Tab 3   • losartan-hydrochlorothiazide (HYZAAR) 100-12.5 MG per tablet TAKE ONE TABLET BY MOUTH ONE TIME DAILY 90 Tab 0   • gabapentin (NEURONTIN) 600 MG tablet TAKE ONE TABLET BY MOUTH THREE TIMES DAILY 90 Tab 0   • oxyCODONE immediate release (ROXICODONE) 10 MG immediate release tablet      • XTAMPZA ER 27 MG Capsule Extended Release 12 hour Abuse-Deterrent      • baclofen (LIORESAL) 10 MG Tab      • tadalafil (CIALIS) 10 MG tablet Take 1 Tab by mouth as needed for Erectile Dysfunction. 60 Tab 0   • tizanidine (ZANAFLEX) 4 MG Tab Take 1 Tab by mouth every 6 hours as needed. 90 Tab 3   • traZODone (DESYREL) 100 MG Tab TAKE TWO TABLETS BY MOUTH AT BEDTIME AS NEEDED FOR SLEEP 180 Tab 3   • buPROPion SR (WELLBUTRIN-SR) 150 MG TABLET SR 12 HR sustained-release tablet TAKE 1 TABLET BY MOUTH ONCE DAILY FOR FIRST 3 DAYS THEN TAKE 1 TABLET TWICE DAILY THEREAFTER AS DIRECTED BY DR 90 Tab 3   • albuterol 108 (90 Base) MCG/ACT Aero Soln inhalation aerosol Inhale 2 Puffs by mouth every 6 hours as needed. 8.5 g 3   • Calcium Carbonate Antacid (TUMS PO) Take  by mouth as needed.       No current facility-administered medications for this visit.        Social History     Tobacco Use   • Smoking status: Former Smoker     Packs/day: 0.25     Years: 6.00     Pack years: 1.50     Types: Cigarettes     Last attempt to quit: 08/2019     Years since  "quittin.2   • Smokeless tobacco: Never Used   • Tobacco comment: 3/ day   Substance Use Topics   • Alcohol use: No     Alcohol/week: 0.0 oz   • Drug use: No     Comment: methamphetamine x 2 years stopped        Family Status   Relation Name Status   • Mo  Alive   • Fa  Alive   • Bro  Alive   • Bro  Alive     Family History   Problem Relation Age of Onset   • Lung Disease Mother         COPD   • Arthritis Mother         RA   • Cancer Father         leukemia, prostate cancer   • No Known Problems Brother    • No Known Problems Brother        Review of Systems:   Constitutional:  Negative for fever, chills, weight loss and malaise/fatigue.   HEENT:  Negative for ear pain, nosebleeds, congestion, sore throat and neck pain.    Eyes:  Negative for blurred vision.   Respiratory:  Negative for cough, sputum production, shortness of breath and wheezing.    Cardiovascular:  Negative for chest pain, palpitations, orthopnea and leg swelling.   Gastrointestinal:  Negative for heartburn, nausea, vomiting and abdominal pain.   Genitourinary:  Negative for dysuria, urgency and frequency.   Musculoskeletal:  Negative for myalgias, back pain and joint pain.   Neurological:  Negative for dizziness, tingling, tremors, sensory change, focal weakness and headaches.   Psychiatric/Behavioral:  Negative for depression, suicidal ideas and memory loss.  The patient is not nervous/anxious and does not have insomnia.    All other systems reviewed and are negative except as in HPI.    Exam:  /84 (BP Location: Left arm, Patient Position: Sitting, BP Cuff Size: Adult)   Pulse 88   Temp 36.7 °C (98 °F) (Temporal)   Resp 18   Ht 1.778 m (5' 10\")   Wt 94.8 kg (209 lb)   SpO2 95%   General:  Normal appearing. No distress.  HEENT:  Normocephalic. Eyes conjunctiva clear lids without ptosis, pupils equal and reactive to light accommodation, ears normal shape and contour, canals are clear bilaterally, tympanic membranes are benign, " nasal mucosa benign, oropharynx is without erythema, edema or exudates.   Pulmonary:  Clear to ausculation.  Normal effort. No rales, ronchi, or wheezing.  Cardiovascular:  Regular rate and rhythm without murmur. Carotid and radial pulses are intact and equal bilaterally.  Neurologic:  Grossly nonfocal  Skin:  Warm and dry.  No obvious lesions.  Musculoskeletal:  Normal gait. No extremity cyanosis, clubbing, or edema.  Psych:  Normal mood and affect. Alert and oriented x3. Judgment and insight is normal.    PLAN:    1. Migraine without aura and with status migrainosus, not intractable  5. New daily persistent headache  Discussed with the patient the importance of getting an MRI of his brain to rule out anything that may be causing his consistent headache/migraines.  - MR-BRAIN-W/O; Future    2. Acute right-sided low back pain with sciatica, sciatica laterality unspecified  Chronic but stable. Continue to follow up with pain management to manage pain.    3. Essential hypertension  Chronic but stable. Continue to take medication as prescribed.    4. Environmental allergies  Chronic but stable. Continue to take OTC Xyzal.    Follow up based on imaging results and what pain management says. Patient is encouraged to be seen in the emergency room for chest pain, palpitations, shortness of breath, dizziness, severe abdominal pain or other concerning symptoms.    Please note that this dictation was created using voice recognition software. I have made every reasonable attempt to correct obvious errors, but I expect that there are errors of grammar and possibly content that I did not discover before finalizing the note.    Assessment/Plan:  1. Migraine without aura and with status migrainosus, not intractable  MR-BRAIN-W/O   2. Acute right-sided low back pain with sciatica, sciatica laterality unspecified     3. Essential hypertension     4. Environmental allergies     5. New daily persistent headache  MR-BRAIN-W/O           I have placed the below orders and discussed them with an approved delegating provider. The MA is performing the below orders under the direction of Dr. Laird.

## 2019-10-30 NOTE — ASSESSMENT & PLAN NOTE
Patient has been having a constant headache for the past 2 weeks. Patient states the the Fiorcet helped relieve the headache but he did not like the way it made him feel. States the pain is an 8-9 out of 10 when it is at its worse, but can get it down to a 2-3 out of 10 with medication. Pain management started him on Topamax but he did not feel like this did anything for him. Patient states he has some stress in his life, but does not believe this is making his headaches worse.

## 2019-10-30 NOTE — ASSESSMENT & PLAN NOTE
Chronic in nature. Currently taking pain medication which pain management is following. Patient states that is pain is still an 8-9 out of 10. States that he feels it most in the mornings when he first wakes up. States that keeping active and moving through out the day helps with the pain.

## 2020-02-24 ENCOUNTER — OFFICE VISIT (OUTPATIENT)
Dept: MEDICAL GROUP | Facility: PHYSICIAN GROUP | Age: 45
End: 2020-02-24
Payer: COMMERCIAL

## 2020-02-24 VITALS
SYSTOLIC BLOOD PRESSURE: 148 MMHG | OXYGEN SATURATION: 94 % | HEART RATE: 82 BPM | WEIGHT: 214.6 LBS | HEIGHT: 71 IN | RESPIRATION RATE: 20 BRPM | BODY MASS INDEX: 30.04 KG/M2 | TEMPERATURE: 98.8 F | DIASTOLIC BLOOD PRESSURE: 104 MMHG

## 2020-02-24 DIAGNOSIS — I10 ESSENTIAL HYPERTENSION: ICD-10-CM

## 2020-02-24 DIAGNOSIS — G43.001 MIGRAINE WITHOUT AURA AND WITH STATUS MIGRAINOSUS, NOT INTRACTABLE: ICD-10-CM

## 2020-02-24 DIAGNOSIS — F51.01 PRIMARY INSOMNIA: ICD-10-CM

## 2020-02-24 DIAGNOSIS — R06.83 SNORING: ICD-10-CM

## 2020-02-24 DIAGNOSIS — R06.02 WAKING AT NIGHT SHORT OF BREATH: ICD-10-CM

## 2020-02-24 DIAGNOSIS — R06.89 GASPING FOR BREATH: ICD-10-CM

## 2020-02-24 PROCEDURE — 99214 OFFICE O/P EST MOD 30 MIN: CPT | Performed by: NURSE PRACTITIONER

## 2020-02-24 RX ORDER — KETOROLAC TROMETHAMINE 30 MG/ML
60 INJECTION, SOLUTION INTRAMUSCULAR; INTRAVENOUS ONCE
Status: COMPLETED | OUTPATIENT
Start: 2020-02-24 | End: 2020-02-24

## 2020-02-24 RX ADMIN — KETOROLAC TROMETHAMINE 60 MG: 30 INJECTION, SOLUTION INTRAMUSCULAR; INTRAVENOUS at 16:00

## 2020-02-24 ASSESSMENT — PATIENT HEALTH QUESTIONNAIRE - PHQ9: CLINICAL INTERPRETATION OF PHQ2 SCORE: 0

## 2020-02-25 NOTE — PROGRESS NOTES
Chief Complaint   Patient presents with   • Orders Needed     Sleep Study        HISTORY OF PRESENT ILLNESS: Patient is a 44 y.o. male established patient who presents today to discuss several issues.     Hypertension  Chronic in nature.  Blood pressure is significantly elevated today related to severe pain.  Patient states that he also did not take his blood pressure medication today and states that he will take it as soon as he goes out to his truck.  Patient denies chest pain, palpitations, dizziness, blurry vision.    Primary insomnia  Patient reports that sleep has been significantly worse states that he has been having difficulty falling asleep but also has been waking up 4-5 times a night states that he has been waking up gasping for breath states that he is snoring patient states that he would like to go obtain sleep study.  States that he has not noticed anything that has made his sleep better or worse.    Migraine without aura and with status migrainosus, not intractable  Chronic in nature.  Worsening.  Patient states that migraines have been steady but that he had 3 days in a row of migraine states that migraines did improve and then returned the next day states that 2 of them started in the morning and then on the third day the migraines started at approximately 2 in the afternoon.  Patient states that these have been different in the past migraines have consisted of throbbing behind a single eye, throbbing in the back of his head states that with these migraines he has had some pain but the more concerning symptom has been an initial loss of vision.  Patient states that he has not noticed anything that will make this improve just notes that it resolves over time.      Patient Active Problem List    Diagnosis Date Noted   • Gasping for breath 02/24/2020   • Waking at night short of breath 02/24/2020   • Migraine without aura and with status migrainosus, not intractable 10/17/2019   • Anxiety 02/11/2019    • Chronic use of opiate drug for therapeutic purpose 09/25/2018   • Nicotine addiction 10/18/2017   • Chronic pain of right knee 10/18/2017   • Other chronic pain 10/18/2017   • Environmental allergies 06/07/2017   • Right-sided low back pain with sciatica 06/07/2017   • ED (erectile dysfunction) 06/07/2017   • Primary insomnia 03/01/2017   • Obesity (BMI 30-39.9) 03/01/2017   • Right shoulder pain 11/29/2016   • Hypertension 08/19/2016   • Bursitis of right shoulder 08/19/2016       Allergies:Shellfish allergy    Current Outpatient Medications   Medication Sig Dispense Refill   • losartan-hydrochlorothiazide (HYZAAR) 100-12.5 MG per tablet TAKE ONE TABLET BY MOUTH ONE TIME DAILY 90 Tab 0   • gabapentin (NEURONTIN) 600 MG tablet TAKE ONE TABLET BY MOUTH THREE TIMES DAILY 90 Tab 0   • oxyCODONE immediate release (ROXICODONE) 10 MG immediate release tablet      • XTAMPZA ER 27 MG Capsule Extended Release 12 hour Abuse-Deterrent      • tadalafil (CIALIS) 10 MG tablet Take 1 Tab by mouth as needed for Erectile Dysfunction. 60 Tab 0   • albuterol 108 (90 Base) MCG/ACT Aero Soln inhalation aerosol Inhale 2 Puffs by mouth every 6 hours as needed. 8.5 g 3   • Calcium Carbonate Antacid (TUMS PO) Take  by mouth as needed.     • methylPREDNISolone (MEDROL DOSEPAK) 4 MG Tablet Therapy Pack      • SUMAtriptan (IMITREX) 50 MG Tab Take 1 Tab by mouth Once PRN for Migraine for up to 1 dose. (Patient not taking: Reported on 2/24/2020) 10 Tab 3   • baclofen (LIORESAL) 10 MG Tab      • tizanidine (ZANAFLEX) 4 MG Tab Take 1 Tab by mouth every 6 hours as needed. (Patient not taking: Reported on 2/24/2020) 90 Tab 3   • traZODone (DESYREL) 100 MG Tab TAKE TWO TABLETS BY MOUTH AT BEDTIME AS NEEDED FOR SLEEP (Patient not taking: Reported on 2/24/2020) 180 Tab 3   • buPROPion SR (WELLBUTRIN-SR) 150 MG TABLET SR 12 HR sustained-release tablet TAKE 1 TABLET BY MOUTH ONCE DAILY FOR FIRST 3 DAYS THEN TAKE 1 TABLET TWICE DAILY THEREAFTER AS  DIRECTED BY  (Patient not taking: Reported on 2020) 90 Tab 3     No current facility-administered medications for this visit.        Social History     Tobacco Use   • Smoking status: Former Smoker     Packs/day: 0.25     Years: 6.00     Pack years: 1.50     Types: Cigarettes     Last attempt to quit: 2019     Years since quittin.5   • Smokeless tobacco: Never Used   • Tobacco comment: 3/ day   Substance Use Topics   • Alcohol use: No     Alcohol/week: 0.0 oz   • Drug use: No     Comment: methamphetamine x 2 years stopped        Family Status   Relation Name Status   • Mo  Alive   • Fa  Alive   • Bro  Alive   • Bro  Alive     Family History   Problem Relation Age of Onset   • Lung Disease Mother         COPD   • Arthritis Mother         RA   • Cancer Father         leukemia, prostate cancer   • No Known Problems Brother    • No Known Problems Brother        Review of Systems:   Constitutional:  Negative for fever, chills, weight loss and malaise/fatigue.   HEENT:  Negative for ear pain, nosebleeds, congestion, sore throat and neck pain.    Eyes:  Negative for blurred vision.   Respiratory:  Negative for cough, sputum production, shortness of breath and wheezing.    Cardiovascular:  Negative for chest pain, palpitations, orthopnea and leg swelling.   Gastrointestinal:  Negative for heartburn, nausea, vomiting and abdominal pain.   Genitourinary:  Negative for dysuria, urgency and frequency.   Musculoskeletal:  Negative for myalgias, back pain and joint pain.   Skin:  Negative for rash and itching.   Neurological:  Negative for dizziness, tingling, tremors, sensory change, focal weakness and positive headaches.   Endo/Heme/Allergies:  Does not bruise/bleed easily.   Psychiatric/Behavioral:  Negative for depression, suicidal ideas and memory loss.  The patient is not nervous/anxious and does not have insomnia.    All other systems reviewed and are negative except as in HPI.    Exam:  /104 (BP  "Location: Right arm, Patient Position: Sitting, BP Cuff Size: Adult)   Pulse 82   Temp 37.1 °C (98.8 °F) (Temporal)   Resp 20   Ht 1.803 m (5' 11\")   Wt 97.3 kg (214 lb 9.6 oz)   SpO2 94%   General: Patient appears in pain, mild distress.    HEENT:  Normocephalic. Eyes conjunctiva clear lids without ptosis, pupils equal and reactive to light accommodation, ears normal shape and contour, canals are clear bilaterally, tympanic membranes are benign, nasal mucosa benign, oropharynx is without erythema, edema or exudates.   Pulmonary:  Clear to ausculation.  Normal effort. No rales, ronchi, or wheezing.  Cardiovascular:  Regular rate and rhythm without murmur. Carotid and radial pulses are intact and equal bilaterally.  Neurologic:  Grossly nonfocal.  Radial nerves II through XII intact.  Lymph:  No cervical, supraclavicular or axillary lymph nodes are palpable  Skin:  Warm and dry.  No obvious lesions.  Musculoskeletal:  Normal gait. No extremity cyanosis, clubbing, or edema.  Patient does have tenderness in his neck  Psych:  Normal mood and affect. Alert and oriented x3. Judgment and insight is normal.      PLAN:    1. Gasping for breath  2. Waking at night short of breath  4. Primary insomnia  Patient will complete sleep study.  - REFERRAL TO SLEEP STUDIES    3. Essential hypertension  Patient will take blood pressure medication and check blood pressure tomorrow patient will report blood pressures.    5. Migraine without aura and with status migrainosus, not intractable  Plan to try Toradol to see if this will calm patient's current throbbing sharp headache.  Also recommend MRI of brain/follow-up with neurology.  - ketorolac (TORADOL) injection 60 mg    6. Snoring  - REFERRAL TO SLEEP STUDIES      Please note that this dictation was created using voice recognition software. I have made every reasonable attempt to correct obvious errors, but I expect that there are errors of grammar and possibly content that I " did not discover before finalizing the note.    Assessment/Plan:  1. Gasping for breath  REFERRAL TO SLEEP STUDIES   2. Waking at night short of breath  REFERRAL TO SLEEP STUDIES   3. Essential hypertension     4. Primary insomnia  REFERRAL TO SLEEP STUDIES   5. Migraine without aura and with status migrainosus, not intractable  ketorolac (TORADOL) injection 60 mg   6. Snoring  REFERRAL TO SLEEP STUDIES          I have placed the below orders and discussed them with an approved delegating provider. The MA is performing the below orders under the direction of Dr. Laird.

## 2020-02-25 NOTE — ASSESSMENT & PLAN NOTE
Chronic in nature.  Blood pressure is significantly elevated today related to severe pain.  Patient states that he also did not take his blood pressure medication today and states that he will take it as soon as he goes out to his truck.  Patient denies chest pain, palpitations, dizziness, blurry vision.

## 2020-02-25 NOTE — ASSESSMENT & PLAN NOTE
Patient reports that sleep has been significantly worse states that he has been having difficulty falling asleep but also has been waking up 4-5 times a night states that he has been waking up gasping for breath states that he is snoring patient states that he would like to go obtain sleep study.  States that he has not noticed anything that has made his sleep better or worse.

## 2020-02-25 NOTE — ASSESSMENT & PLAN NOTE
Chronic in nature.  Worsening.  Patient states that migraines have been steady but that he had 3 days in a row of migraine states that migraines did improve and then returned the next day states that 2 of them started in the morning and then on the third day the migraines started at approximately 2 in the afternoon.  Patient states that these have been different in the past migraines have consisted of throbbing behind a single eye, throbbing in the back of his head states that with these migraines he has had some pain but the more concerning symptom has been an initial loss of vision.  Patient states that he has not noticed anything that will make this improve just notes that it resolves over time.

## 2020-03-11 ENCOUNTER — PATIENT MESSAGE (OUTPATIENT)
Dept: MEDICAL GROUP | Facility: PHYSICIAN GROUP | Age: 45
End: 2020-03-11

## 2020-03-11 DIAGNOSIS — F41.9 ANXIETY: ICD-10-CM

## 2020-07-15 ENCOUNTER — APPOINTMENT (OUTPATIENT)
Dept: SLEEP MEDICINE | Facility: MEDICAL CENTER | Age: 45
End: 2020-07-15
Payer: COMMERCIAL

## 2020-09-27 ENCOUNTER — PATIENT MESSAGE (OUTPATIENT)
Dept: MEDICAL GROUP | Facility: PHYSICIAN GROUP | Age: 45
End: 2020-09-27

## 2020-09-28 RX ORDER — ALBUTEROL SULFATE 90 UG/1
2 AEROSOL, METERED RESPIRATORY (INHALATION) EVERY 4 HOURS PRN
Qty: 18 G | Refills: 0 | Status: SHIPPED | OUTPATIENT
Start: 2020-09-28 | End: 2020-11-05

## 2020-10-04 ENCOUNTER — OFFICE VISIT (OUTPATIENT)
Dept: URGENT CARE | Facility: PHYSICIAN GROUP | Age: 45
End: 2020-10-04
Payer: COMMERCIAL

## 2020-10-04 VITALS
SYSTOLIC BLOOD PRESSURE: 126 MMHG | RESPIRATION RATE: 14 BRPM | HEART RATE: 76 BPM | DIASTOLIC BLOOD PRESSURE: 80 MMHG | OXYGEN SATURATION: 98 % | TEMPERATURE: 98.2 F

## 2020-10-04 DIAGNOSIS — H00.015 HORDEOLUM OF LEFT LOWER EYELID, UNSPECIFIED HORDEOLUM TYPE: ICD-10-CM

## 2020-10-04 PROCEDURE — 99214 OFFICE O/P EST MOD 30 MIN: CPT | Performed by: NURSE PRACTITIONER

## 2020-10-04 RX ORDER — TOBRAMYCIN 3 MG/ML
2 SOLUTION/ DROPS OPHTHALMIC EVERY 4 HOURS
Qty: 5 ML | Refills: 0 | Status: SHIPPED | OUTPATIENT
Start: 2020-10-04 | End: 2020-10-11

## 2020-10-04 ASSESSMENT — ENCOUNTER SYMPTOMS
CHILLS: 0
FEVER: 0
BLURRED VISION: 0
EYE PAIN: 1
CONSTITUTIONAL NEGATIVE: 1
EYE REDNESS: 1
EYE DISCHARGE: 0

## 2020-10-04 ASSESSMENT — VISUAL ACUITY: OU: 1

## 2020-10-04 NOTE — PROGRESS NOTES
Subjective:     Leo Manuel is a 45 y.o. male who presents for Eye Problem (L eye pain, bump x2-3 days)       Eye Problem   The left eye is affected. This is a new problem. Episode onset: 3 days ago. The problem has been gradually worsening. There was no injury mechanism. The pain is moderate. He does not wear contacts. Associated symptoms include eye redness. Pertinent negatives include no blurred vision, eye discharge or fever. Treatments tried: Warm compresses. The treatment provided no relief.     Patient reporting swelling, pain, and redness of his left lower eyelid and what appears to be a white bump.    Patient was screened prior to rooming and denied COVID-19 diagnosis or contact with a person who has been diagnosed or is suspected to have COVID-19. During this visit, appropriate PPE was worn, hand hygiene was performed, and the patient and any visitors were masked.     PMH:  has a past medical history of Elevated cholesterol, Heart burn, Hypertension, and Pain.    MEDS:   Current Outpatient Medications:   •  tobramycin (TOBREX) 0.3 % Solution ophthalmic solution, Place 2 Drops in left eye every 4 hours for 7 days., Disp: 5 mL, Rfl: 0  •  albuterol 108 (90 Base) MCG/ACT Aero Soln inhalation aerosol, Inhale 2 Puffs by mouth every four hours as needed for Shortness of Breath., Disp: 18 g, Rfl: 0  •  losartan (COZAAR) 100 MG Tab, TAKE ONE TABLET BY MOUTH EVERY DAY, Disp: 90 Tab, Rfl: 3  •  methylPREDNISolone (MEDROL DOSEPAK) 4 MG Tablet Therapy Pack, , Disp: , Rfl:   •  SUMAtriptan (IMITREX) 50 MG Tab, Take 1 Tab by mouth Once PRN for Migraine for up to 1 dose. (Patient not taking: Reported on 2/24/2020), Disp: 10 Tab, Rfl: 3  •  losartan-hydrochlorothiazide (HYZAAR) 100-12.5 MG per tablet, TAKE ONE TABLET BY MOUTH ONE TIME DAILY, Disp: 90 Tab, Rfl: 0  •  gabapentin (NEURONTIN) 600 MG tablet, TAKE ONE TABLET BY MOUTH THREE TIMES DAILY, Disp: 90 Tab, Rfl: 0  •  oxyCODONE immediate release (ROXICODONE) 10  MG immediate release tablet, , Disp: , Rfl:   •  XTAMPZA ER 27 MG Capsule Extended Release 12 hour Abuse-Deterrent, , Disp: , Rfl:   •  baclofen (LIORESAL) 10 MG Tab, , Disp: , Rfl:   •  tadalafil (CIALIS) 10 MG tablet, Take 1 Tab by mouth as needed for Erectile Dysfunction., Disp: 60 Tab, Rfl: 0  •  tizanidine (ZANAFLEX) 4 MG Tab, Take 1 Tab by mouth every 6 hours as needed. (Patient not taking: Reported on 2/24/2020), Disp: 90 Tab, Rfl: 3  •  traZODone (DESYREL) 100 MG Tab, TAKE TWO TABLETS BY MOUTH AT BEDTIME AS NEEDED FOR SLEEP (Patient not taking: Reported on 2/24/2020), Disp: 180 Tab, Rfl: 3  •  buPROPion SR (WELLBUTRIN-SR) 150 MG TABLET SR 12 HR sustained-release tablet, TAKE 1 TABLET BY MOUTH ONCE DAILY FOR FIRST 3 DAYS THEN TAKE 1 TABLET TWICE DAILY THEREAFTER AS DIRECTED BY  (Patient not taking: Reported on 2/24/2020), Disp: 90 Tab, Rfl: 3  •  albuterol 108 (90 Base) MCG/ACT Aero Soln inhalation aerosol, Inhale 2 Puffs by mouth every 6 hours as needed., Disp: 8.5 g, Rfl: 3  •  Calcium Carbonate Antacid (TUMS PO), Take  by mouth as needed., Disp: , Rfl:     ALLERGIES:   Allergies   Allergen Reactions   • Shellfish Allergy Anaphylaxis     SURGHX:   Past Surgical History:   Procedure Laterality Date   • SHOULDER DECOMPRESSION ARTHROSCOPIC Right 11/29/2016    Procedure: SHOULDER DECOMPRESSION ARTHROSCOPIC - SUBACROMIAL W/LABRAL DEBRIDEMENT;  Surgeon: Linnette Freeman M.D.;  Location: SURGERY [facility];  Service:    • CLAVICLE DISTAL EXCISION  11/29/2016    Procedure: CLAVICLE DISTAL EXCISION;  Surgeon: Linnette Freeman M.D.;  Location: SURGERY [facility];  Service:    • APPENDECTOMY  1988     SOCHX:  reports that he quit smoking about 14 months ago. His smoking use included cigarettes. He has a 1.50 pack-year smoking history. He has never used smokeless tobacco. He reports that he does not drink alcohol or use drugs.     FH: Reviewed with patient, not pertinent to this visit.    Review of  Systems   Constitutional: Negative.  Negative for chills, fever and malaise/fatigue.   HENT: Negative.    Eyes: Positive for pain and redness. Negative for blurred vision and discharge.   All other systems reviewed and are negative.    Additional details per HPI.      Objective:     /80 (BP Location: Left arm, Patient Position: Sitting, BP Cuff Size: Small adult)   Pulse 76   Temp 36.8 °C (98.2 °F) (Temporal)   Resp 14   SpO2 98%     Physical Exam  Vitals signs reviewed.   Constitutional:       General: He is not in acute distress.     Appearance: He is well-developed. He is not ill-appearing or toxic-appearing.   HENT:      Head: Normocephalic.      Right Ear: External ear normal.      Left Ear: External ear normal.      Nose: Nose normal.   Eyes:      General: Vision grossly intact.         Right eye: No discharge.         Left eye: No discharge.      Extraocular Movements: Extraocular movements intact.      Conjunctiva/sclera: Conjunctivae normal.      Right eye: Right conjunctiva is not injected. No chemosis.     Left eye: Left conjunctiva is not injected. No chemosis.     Pupils: Pupils are equal, round, and reactive to light.      Comments: Redness, swelling of left lower eyelid with small, tenderness bulge in center   Neck:      Musculoskeletal: Normal range of motion.   Cardiovascular:      Rate and Rhythm: Normal rate.   Pulmonary:      Effort: Pulmonary effort is normal. No respiratory distress.   Musculoskeletal: Normal range of motion.         General: No deformity.   Skin:     General: Skin is warm and dry.      Coloration: Skin is not pale.   Neurological:      Mental Status: He is alert and oriented to person, place, and time.      Sensory: No sensory deficit.      Motor: No weakness.      Coordination: Coordination normal.   Psychiatric:         Behavior: Behavior normal. Behavior is cooperative.       Assessment/Plan:     1. Hordeolum of left lower eyelid, unspecified hordeolum type  -  tobramycin (TOBREX) 0.3 % Solution ophthalmic solution; Place 2 Drops in left eye every 4 hours for 7 days.  Dispense: 5 mL; Refill: 0    Rx as above sent electronically. Continue with warm moist compresses.    Differential diagnosis, natural history, supportive care, over-the-counter symptom management per 's instructions, close monitoring, and indications for immediate follow-up discussed.     Patient advised to: Return for 1) Symptoms that worsen/don't improve, or go to ER, 2) Follow up with primary care in 7-10 days.    All questions answered. Patient agrees with the plan of care.

## 2020-11-05 ENCOUNTER — OFFICE VISIT (OUTPATIENT)
Dept: MEDICAL GROUP | Facility: PHYSICIAN GROUP | Age: 45
End: 2020-11-05
Payer: COMMERCIAL

## 2020-11-05 VITALS
SYSTOLIC BLOOD PRESSURE: 152 MMHG | RESPIRATION RATE: 16 BRPM | TEMPERATURE: 98 F | DIASTOLIC BLOOD PRESSURE: 92 MMHG | WEIGHT: 217.8 LBS | HEIGHT: 71 IN | HEART RATE: 63 BPM | BODY MASS INDEX: 30.49 KG/M2 | OXYGEN SATURATION: 97 %

## 2020-11-05 DIAGNOSIS — E66.9 OBESITY (BMI 30-39.9): ICD-10-CM

## 2020-11-05 DIAGNOSIS — F51.01 PRIMARY INSOMNIA: ICD-10-CM

## 2020-11-05 DIAGNOSIS — L98.9 NON-HEALING SKIN LESION: ICD-10-CM

## 2020-11-05 DIAGNOSIS — N52.9 ERECTILE DYSFUNCTION, UNSPECIFIED ERECTILE DYSFUNCTION TYPE: ICD-10-CM

## 2020-11-05 DIAGNOSIS — R06.89 GASPING FOR BREATH: ICD-10-CM

## 2020-11-05 DIAGNOSIS — I10 ESSENTIAL HYPERTENSION: ICD-10-CM

## 2020-11-05 DIAGNOSIS — M54.40 ACUTE RIGHT-SIDED LOW BACK PAIN WITH SCIATICA, SCIATICA LATERALITY UNSPECIFIED: ICD-10-CM

## 2020-11-05 DIAGNOSIS — R06.02 WAKING AT NIGHT SHORT OF BREATH: ICD-10-CM

## 2020-11-05 DIAGNOSIS — Z23 NEED FOR VACCINATION: ICD-10-CM

## 2020-11-05 PROCEDURE — 99214 OFFICE O/P EST MOD 30 MIN: CPT | Mod: 25 | Performed by: NURSE PRACTITIONER

## 2020-11-05 PROCEDURE — 90686 IIV4 VACC NO PRSV 0.5 ML IM: CPT | Performed by: NURSE PRACTITIONER

## 2020-11-05 PROCEDURE — 90471 IMMUNIZATION ADMIN: CPT | Performed by: NURSE PRACTITIONER

## 2020-11-05 RX ORDER — TADALAFIL 10 MG/1
10 TABLET ORAL PRN
Qty: 88 TAB | Refills: 1 | Status: SHIPPED | OUTPATIENT
Start: 2020-11-05

## 2020-11-05 RX ORDER — FENTANYL 25 UG/1
PATCH TRANSDERMAL
COMMUNITY
Start: 2020-10-02

## 2020-11-05 RX ORDER — FENTANYL 12.5 UG/1
PATCH TRANSDERMAL
COMMUNITY
Start: 2020-10-29

## 2020-11-05 RX ORDER — DANTROLENE SODIUM 25 MG/1
CAPSULE ORAL
COMMUNITY
Start: 2020-10-29

## 2020-11-05 RX ORDER — ERENUMAB-AOOE 140 MG/ML
INJECTION, SOLUTION SUBCUTANEOUS
COMMUNITY
Start: 2020-10-02

## 2020-11-05 NOTE — PROGRESS NOTES
Chief Complaint   Patient presents with   • Asthma     Breathing issues   • Bump     crusted over        HISTORY OF PRESENT ILLNESS: Patient is a 45 y.o. male established patient who presents today to discuss multiple issues.    Hypertension  Chronic in nature.  Patient states that he took his blood pressure medication approximately 5 minutes ago.  States that it has not had time to kick in.  States that this is working well and that his blood pressure is usually running 120s over 80s with medication on board.  States that pain has been contributing to some higher blood pressures but that overall he is doing okay.  Patient denies chest pain, palpitations, dizziness, blurry vision.    Primary insomnia  Chronic in nature.  Stable.  States that he has been having difficulty with this related to more severe pain.  There are also some significant concerns with sleep apnea may be contributing to his insomnia.  We will continue to monitor.    Obesity (BMI 30-39.9)  Chronic in nature.  Stable.  Patient states that he is working on healthy diet and exercise but states that this has been difficult this past year.    Right-sided low back pain with sciatica  Chronic in nature.  Stable.  Continues to follow with pain management and is taking medication as prescribed.  States that he was frustrated that his insurance would no longer cover his long-acting pain control and as such she is using fentanyl patches states he does not have side effects at this time.  States that pain is severe in the morning but that moving does help improve it.    ED (erectile dysfunction)  Chronic in nature.  Intermittent.  States that he has been taking Cialis and it is working well denies any side effects.  States that he was told that erectile dysfunction was related to pain medication prescription.  States that when he is off pain medication he is able to get an erection but that he is not able to function without medication for pain.    Waking at  "night short of breath  This is a continued issue.  Patient states that he was advised to come in for a sleep study to address this issue but chose not to related to the fact that they wanted to do his sleep study with arrival of noon or 3 PM patient states that he is unable to sleep during the day and as such was frustrated.  he does continue to wake at night short of breath, snoring has become worse and he continues to have episodes of gasping for air.  He would like to complete a sleep study but states that he would like to do it \"somewhere else\".    Non-healing skin lesion  Chronic in nature.  Present since prior to visit in February.  Patient has several small areas on his left lower forearm near his wrist that have not healed for many months.  Usually patient stated that they were just dry and itchy appearing but states that recently they have become crusty.  Dates that despite use of lotion they have not improved or resolved.  Patient is concerned regarding possible skin cancer.      Patient Active Problem List    Diagnosis Date Noted   • Non-healing skin lesion 11/05/2020   • Gasping for breath 02/24/2020   • Waking at night short of breath 02/24/2020   • Migraine without aura and with status migrainosus, not intractable 10/17/2019   • Anxiety 02/11/2019   • Chronic use of opiate drug for therapeutic purpose 09/25/2018   • Nicotine addiction 10/18/2017   • Chronic pain of right knee 10/18/2017   • Other chronic pain 10/18/2017   • Environmental allergies 06/07/2017   • Right-sided low back pain with sciatica 06/07/2017   • ED (erectile dysfunction) 06/07/2017   • Primary insomnia 03/01/2017   • Obesity (BMI 30-39.9) 03/01/2017   • Right shoulder pain 11/29/2016   • Hypertension 08/19/2016   • Bursitis of right shoulder 08/19/2016       Allergies:Shellfish allergy    Current Outpatient Medications   Medication Sig Dispense Refill   • dantrolene (DANTRIUM) 25 MG Cap      • AIMOVIG 140 MG/ML Solution " Auto-injector      • fentaNYL (DURAGESIC) 12 MCG/HR PATCH 72 HR      • GABAPENTIN PO Take 3 tablet by mouth every day. 900 mg     • tadalafil (CIALIS) 10 MG tablet Take 1 Tab by mouth as needed for Erectile Dysfunction. 88 Tab 1   • losartan-hydrochlorothiazide (HYZAAR) 100-12.5 MG per tablet TAKE ONE TABLET BY MOUTH ONE TIME DAILY 90 Tab 0   • oxyCODONE immediate release (ROXICODONE) 10 MG immediate release tablet      • albuterol 108 (90 Base) MCG/ACT Aero Soln inhalation aerosol Inhale 2 Puffs by mouth every 6 hours as needed. 8.5 g 3   • Calcium Carbonate Antacid (TUMS PO) Take  by mouth as needed.     • fentaNYL (DURAGESIC) 25 MCG/HR PATCH 72 HR        No current facility-administered medications for this visit.        Social History     Tobacco Use   • Smoking status: Former Smoker     Packs/day: 0.25     Years: 6.00     Pack years: 1.50     Types: Cigarettes     Quit date: 2019     Years since quittin.2   • Smokeless tobacco: Never Used   • Tobacco comment: 3/ day   Substance Use Topics   • Alcohol use: No     Alcohol/week: 0.0 oz   • Drug use: No     Comment: methamphetamine x 2 years stopped        Family Status   Relation Name Status   • Mo  Alive   • Fa  Alive   • Bro  Alive   • Bro  Alive     Family History   Problem Relation Age of Onset   • Lung Disease Mother         COPD   • Arthritis Mother         RA   • Cancer Father         leukemia, prostate cancer   • No Known Problems Brother    • No Known Problems Brother        Review of Systems:   Constitutional:  Negative for fever, chills, weight loss and malaise/fatigue.   HEENT:  Negative for ear pain, nosebleeds, congestion, sore throat and neck pain.    Eyes:  Negative for blurred vision.   Respiratory:  Negative for cough, sputum production, shortness of breath and wheezing.    Cardiovascular:  Negative for chest pain, palpitations, orthopnea and leg swelling.   Gastrointestinal:  Negative for heartburn, nausea, vomiting and abdominal  "pain.   Genitourinary:  Negative for dysuria, urgency and frequency.   Musculoskeletal:  positive back pain and joint pain.   Skin: Positive for rash and itching.   Neurological:  Negative for dizziness, tingling, tremors, sensory change, focal weakness and + headaches.   Endo/Heme/Allergies:  Does not bruise/bleed easily.   Psychiatric/Behavioral:  Negative for depression, suicidal ideas and memory loss.  The patient is not nervous/anxious and does not have insomnia.    All other systems reviewed and are negative except as in HPI.    Exam:  /92 (BP Location: Right arm, Patient Position: Sitting, BP Cuff Size: Adult)   Pulse 63   Temp 36.7 °C (98 °F) (Temporal)   Resp 16   Ht 1.803 m (5' 11\")   Wt 98.8 kg (217 lb 12.8 oz)   SpO2 97%   General:  Normal appearing. No distress.  Pulmonary:  Clear to ausculation.  Normal effort. No rales, ronchi, or wheezing.  Cardiovascular:  Regular rate and rhythm without murmur. Carotid and radial pulses are intact and equal bilaterally.  Neurologic:  Grossly nonfocal  Skin:  Warm and dry.  3 small round red scabbing lesions on patient's left forearm near the wrist they have not increased in size since last assessment in February.  Musculoskeletal:  Normal gait. No extremity cyanosis, clubbing, or edema.  Psych:  Normal mood and affect. Alert and oriented x3. Judgment and insight is normal.      PLAN:    1. Non-healing skin lesion  - REFERRAL TO DERMATOLOGY    2. Erectile dysfunction, unspecified erectile dysfunction type  Continue medication.  - tadalafil (CIALIS) 10 MG tablet; Take 1 Tab by mouth as needed for Erectile Dysfunction.  Dispense: 88 Tab; Refill: 1    3. Essential hypertension  Continue plan of care.    4. Primary insomnia  We will monitor.    5. Obesity (BMI 30-39.9)  - Patient identified as having weight management issue.  Appropriate orders and counseling given.    6. Acute right-sided low back pain with sciatica, sciatica laterality " unspecified  Continue follow-up with pain management.    7. Gasping for breath  8. Waking at night short of breath  Discussed need for sleep study.  Referred to [facility].    9. Need for vaccination  - Influenza Vaccine Quad Injection (PF)    Follow-up as needed. Patient is encouraged to be seen in the emergency room for chest pain, palpitations, shortness of breath, dizziness, severe abdominal pain or other concerning symptoms.    Please note that this dictation was created using voice recognition software. I have made every reasonable attempt to correct obvious errors, but I expect that there are errors of grammar and possibly content that I did not discover before finalizing the note.    Assessment/Plan:  1. Non-healing skin lesion  REFERRAL TO DERMATOLOGY   2. Erectile dysfunction, unspecified erectile dysfunction type  tadalafil (CIALIS) 10 MG tablet   3. Essential hypertension     4. Primary insomnia     5. Obesity (BMI 30-39.9)  Patient identified as having weight management issue.  Appropriate orders and counseling given.   6. Acute right-sided low back pain with sciatica, sciatica laterality unspecified     7. Gasping for breath     8. Waking at night short of breath     9. Need for vaccination  Influenza Vaccine Quad Injection (PF)          I have placed the below orders and discussed them with an approved delegating provider. The MA is performing the below orders under the direction of Dr. Laird.

## 2020-11-05 NOTE — ASSESSMENT & PLAN NOTE
Chronic in nature.  Stable.  Continues to follow with pain management and is taking medication as prescribed.  States that he was frustrated that his insurance would no longer cover his long-acting pain control and as such she is using fentanyl patches states he does not have side effects at this time.  States that pain is severe in the morning but that moving does help improve it.

## 2020-11-05 NOTE — ASSESSMENT & PLAN NOTE
Chronic in nature.  Patient states that he took his blood pressure medication approximately 5 minutes ago.  States that it has not had time to kick in.  States that this is working well and that his blood pressure is usually running 120s over 80s with medication on board.  States that pain has been contributing to some higher blood pressures but that overall he is doing okay.  Patient denies chest pain, palpitations, dizziness, blurry vision.

## 2020-11-05 NOTE — ASSESSMENT & PLAN NOTE
Chronic in nature.  Present since prior to visit in February.  Patient has several small areas on his left lower forearm near his wrist that have not healed for many months.  Usually patient stated that they were just dry and itchy appearing but states that recently they have become crusty.  Dates that despite use of lotion they have not improved or resolved.  Patient is concerned regarding possible skin cancer.

## 2020-11-05 NOTE — ASSESSMENT & PLAN NOTE
Chronic in nature.  Stable.  Patient states that he is working on healthy diet and exercise but states that this has been difficult this past year.

## 2020-11-05 NOTE — ASSESSMENT & PLAN NOTE
Chronic in nature.  Stable.  States that he has been having difficulty with this related to more severe pain.  There are also some significant concerns with sleep apnea may be contributing to his insomnia.  We will continue to monitor.

## 2020-11-05 NOTE — ASSESSMENT & PLAN NOTE
"This is a continued issue.  Patient states that he was advised to come in for a sleep study to address this issue but chose not to related to the fact that they wanted to do his sleep study with arrival of noon or 3 PM patient states that he is unable to sleep during the day and as such was frustrated.  he does continue to wake at night short of breath, snoring has become worse and he continues to have episodes of gasping for air.  He would like to complete a sleep study but states that he would like to do it \"somewhere else\".  "

## 2020-11-05 NOTE — ASSESSMENT & PLAN NOTE
Chronic in nature.  Intermittent.  States that he has been taking Cialis and it is working well denies any side effects.  States that he was told that erectile dysfunction was related to pain medication prescription.  States that when he is off pain medication he is able to get an erection but that he is not able to function without medication for pain.

## 2020-11-27 ENCOUNTER — HOSPITAL ENCOUNTER (OUTPATIENT)
Dept: LAB | Facility: MEDICAL CENTER | Age: 45
End: 2020-11-27
Attending: FAMILY MEDICINE
Payer: COMMERCIAL

## 2020-11-27 PROCEDURE — C9803 HOPD COVID-19 SPEC COLLECT: HCPCS

## 2020-11-27 PROCEDURE — U0003 INFECTIOUS AGENT DETECTION BY NUCLEIC ACID (DNA OR RNA); SEVERE ACUTE RESPIRATORY SYNDROME CORONAVIRUS 2 (SARS-COV-2) (CORONAVIRUS DISEASE [COVID-19]), AMPLIFIED PROBE TECHNIQUE, MAKING USE OF HIGH THROUGHPUT TECHNOLOGIES AS DESCRIBED BY CMS-2020-01-R: HCPCS

## 2020-11-28 LAB
COVID ORDER STATUS COVID19: NORMAL
SARS-COV-2 RNA RESP QL NAA+PROBE: NOTDETECTED
SPECIMEN SOURCE: NORMAL

## 2021-01-01 NOTE — TELEPHONE ENCOUNTER
Was the patient seen in the last year in this department? Yes    Does patient have an active prescription for medications requested? No     Received Request Via: Pharmacy   Statement Selected

## 2021-05-19 RX ORDER — LOSARTAN POTASSIUM 100 MG/1
TABLET ORAL
Qty: 90 TABLET | Refills: 0 | Status: SHIPPED | OUTPATIENT
Start: 2021-05-19 | End: 2021-09-07

## 2021-08-18 ENCOUNTER — PATIENT MESSAGE (OUTPATIENT)
Dept: MEDICAL GROUP | Facility: PHYSICIAN GROUP | Age: 46
End: 2021-08-18

## 2021-08-18 DIAGNOSIS — Z20.822 EXPOSURE TO COVID-19 VIRUS: ICD-10-CM

## 2021-08-18 DIAGNOSIS — Z91.09 ENVIRONMENTAL ALLERGIES: ICD-10-CM

## 2021-08-19 RX ORDER — ALBUTEROL SULFATE 90 UG/1
2 AEROSOL, METERED RESPIRATORY (INHALATION) EVERY 6 HOURS PRN
Qty: 8.5 G | Refills: 0 | Status: SHIPPED | OUTPATIENT
Start: 2021-08-19 | End: 2022-01-24

## 2021-09-07 RX ORDER — LOSARTAN POTASSIUM 100 MG/1
TABLET ORAL
Qty: 90 TABLET | Refills: 0 | Status: SHIPPED | OUTPATIENT
Start: 2021-09-07 | End: 2022-01-17

## 2021-09-22 ENCOUNTER — HOSPITAL ENCOUNTER (OUTPATIENT)
Dept: LAB | Facility: MEDICAL CENTER | Age: 46
End: 2021-09-22
Attending: NURSE PRACTITIONER
Payer: COMMERCIAL

## 2021-09-22 DIAGNOSIS — Z20.822 EXPOSURE TO COVID-19 VIRUS: ICD-10-CM

## 2021-09-22 PROCEDURE — U0005 INFEC AGEN DETEC AMPLI PROBE: HCPCS

## 2021-09-22 PROCEDURE — U0003 INFECTIOUS AGENT DETECTION BY NUCLEIC ACID (DNA OR RNA); SEVERE ACUTE RESPIRATORY SYNDROME CORONAVIRUS 2 (SARS-COV-2) (CORONAVIRUS DISEASE [COVID-19]), AMPLIFIED PROBE TECHNIQUE, MAKING USE OF HIGH THROUGHPUT TECHNOLOGIES AS DESCRIBED BY CMS-2020-01-R: HCPCS

## 2021-09-22 PROCEDURE — C9803 HOPD COVID-19 SPEC COLLECT: HCPCS

## 2021-09-23 LAB — COVID ORDER STATUS COVID19: NORMAL

## 2021-09-24 LAB
SARS-COV-2 RNA RESP QL NAA+PROBE: NOTDETECTED
SPECIMEN SOURCE: NORMAL

## 2021-10-19 ENCOUNTER — APPOINTMENT (OUTPATIENT)
Dept: MEDICAL GROUP | Facility: PHYSICIAN GROUP | Age: 46
End: 2021-10-19
Payer: COMMERCIAL

## 2021-10-19 RX ORDER — MORPHINE SULFATE 30 MG/1
TABLET, FILM COATED, EXTENDED RELEASE ORAL
COMMUNITY
Start: 2021-08-31

## 2021-10-19 RX ORDER — GABAPENTIN 600 MG/1
TABLET ORAL
COMMUNITY
Start: 2021-09-07

## 2021-10-19 RX ORDER — OXYCODONE 27 MG/1
CAPSULE, EXTENDED RELEASE ORAL
COMMUNITY
Start: 2021-10-04

## 2021-10-26 ENCOUNTER — OFFICE VISIT (OUTPATIENT)
Dept: MEDICAL GROUP | Facility: PHYSICIAN GROUP | Age: 46
End: 2021-10-26
Payer: COMMERCIAL

## 2021-10-26 DIAGNOSIS — F17.211 CIGARETTE NICOTINE DEPENDENCE IN REMISSION: ICD-10-CM

## 2021-10-26 DIAGNOSIS — Z79.891 CHRONIC USE OF OPIATE DRUG FOR THERAPEUTIC PURPOSE: ICD-10-CM

## 2021-10-26 DIAGNOSIS — I10 ESSENTIAL HYPERTENSION: ICD-10-CM

## 2021-10-26 DIAGNOSIS — E78.2 MIXED HYPERLIPIDEMIA: ICD-10-CM

## 2021-10-26 DIAGNOSIS — G93.31 POST VIRAL SYNDROME: ICD-10-CM

## 2021-10-26 DIAGNOSIS — R53.83 OTHER FATIGUE: ICD-10-CM

## 2021-10-26 DIAGNOSIS — R53.83 FATIGUE, UNSPECIFIED TYPE: ICD-10-CM

## 2021-10-26 DIAGNOSIS — I10 PRIMARY HYPERTENSION: ICD-10-CM

## 2021-10-26 PROCEDURE — 99214 OFFICE O/P EST MOD 30 MIN: CPT | Performed by: NURSE PRACTITIONER

## 2021-10-26 ASSESSMENT — PATIENT HEALTH QUESTIONNAIRE - PHQ9: CLINICAL INTERPRETATION OF PHQ2 SCORE: 0

## 2021-10-28 VITALS
TEMPERATURE: 98.7 F | SYSTOLIC BLOOD PRESSURE: 110 MMHG | BODY MASS INDEX: 28 KG/M2 | RESPIRATION RATE: 20 BRPM | OXYGEN SATURATION: 95 % | HEIGHT: 71 IN | HEART RATE: 76 BPM | WEIGHT: 200 LBS | DIASTOLIC BLOOD PRESSURE: 80 MMHG

## 2021-10-28 PROBLEM — G93.31 POST VIRAL SYNDROME: Status: ACTIVE | Noted: 2021-10-28

## 2021-10-28 PROBLEM — R53.83 OTHER FATIGUE: Status: ACTIVE | Noted: 2021-10-28

## 2021-10-28 NOTE — PROGRESS NOTES
"Subjective:     Chief Complaint   Patient presents with   • Fatigue   • ED Follow-Up   • Bronchitis         HPI:   Leo presents today with     Problem   Post Viral Syndrome    This is a new issue.  Patient was seen in the ER in [place] on 10/10.  He was negative for Covid x2 but states that the ER physician stated \"it looks like Covid\" based on his x-ray.  Patient states he is not sure if he had a false negative test or just a severe bronchitis.  He feels like he may have had some pneumonia.  States that at this point he is still recovering.  States that he has been very fatigued, last week he had some vomiting for about 24 hours which has also resolved.  Patient states that fever, cough, chills, dizziness, shortness of breath, wheezing, nausea vomiting diarrhea have all resolved.  He stated that on the onset of illness he had a loss of strength, does note being in bed for approximately 14 days.  States that he felt like it was difficult to , walk related to muscle fatigue.  He states he has not been sleeping well, states he averages 3 to 4 hours per night.  He states poor sleep is overall unchanged.  He states he still feeling extreme fatigue and some mild pain with deep breaths.  He does also note some continued intermittent headaches but states this is possibly because his Aimovig has not been refilled.  He is in contact with Dr. Olivares's office for this refill.  Ace does mention that he completed all antibiotics as prescribed.     Other Fatigue    Patient mentions today that fatigue started before his recent severe illness.  He states that he has been extremely fatigued off and on for months.  He does mention poor sleep, increased pain.  He does not have any particular recent increase in stress.  He did quit smoking 9/30/2021.  States that even before his recent severe illness he would notice himself having difficulty staying awake during the day.  States that he would notice some dizziness intermittently. "  Patient has had multiple changes in medication recently, and is on fairly high doses of gabapentin, other medications from pain control that can have a side effect of fatigue. Ace reports being concerned related to the degree of exhaustion and his inability to improve it.  He states he does need more work on healthy diet and exercise.  He does report that he has been steadily losing weight and states that this is purposeful.     Chronic Use of Opiate Drug for Therapeutic Purpose    Chronic in nature.  Patient continues to follow-up with Dr. Olivares regarding this issue.  Patient is currently managed on multiple medications for this condition.  He states that back pain continues to be intermittently severe he also continues to note pain in shoulders, knees as previously noted.  He states his low back pain is the most severe even post procedure.  He states procedure did reduce his pain but he continues to have difficulty.  He does state this continues to significantly impact his mood.     Nicotine Addiction    Patient reports that he quit smoking!     Hypertension    Chronic in nature.  Stable.  Blood pressure today is 110/80.  Patient states he has been feeling well and has continued to take his losartan as prescribed.  Patient denies any chest pain, palpitations, dizziness, blurry vision today.         Current Outpatient Medications Ordered in Epic   Medication Sig Dispense Refill   • gabapentin (NEURONTIN) 600 MG tablet      • morphine ER (MS CONTIN) 30 MG Tab CR tablet      • XTAMPZA ER 27 MG Capsule Extended Release 12 hour Abuse-Deterrent      • losartan (COZAAR) 100 MG Tab TAKE ONE TABLET BY MOUTH EVERY DAY 90 Tablet 0   • albuterol 108 (90 Base) MCG/ACT Aero Soln inhalation aerosol Inhale 2 Puffs every 6 hours as needed. 8.5 g 0   • dantrolene (DANTRIUM) 25 MG Cap      • AIMOVIG 140 MG/ML Solution Auto-injector      • fentaNYL (DURAGESIC) 12 MCG/HR PATCH 72 HR      • fentaNYL (DURAGESIC) 25 MCG/HR PATCH 72 HR   "    • GABAPENTIN PO Take 3 tablet by mouth every day. 900 mg     • tadalafil (CIALIS) 10 MG tablet Take 1 Tab by mouth as needed for Erectile Dysfunction. 88 Tab 1   • oxyCODONE immediate release (ROXICODONE) 10 MG immediate release tablet      • Calcium Carbonate Antacid (TUMS PO) Take  by mouth as needed.       No current Epic-ordered facility-administered medications on file.       Health Maintenance: Not addressed at this visit    ROS:  Gen: no fevers/chills, no changes in weight  Eyes: no changes in vision  ENT: no sore throat, no hearing loss, no bloody nose  Pulm: no sob, no cough  CV: no chest pain, no palpitations  GI: no nausea/vomiting, no diarrhea  : no dysuria  MSk: no myalgias  Skin: no rash  Neuro: no headaches, no numbness/tingling  Heme/Lymph: no easy bruising      Objective:     Exam:  /80 (BP Location: Right arm, Patient Position: Sitting)   Pulse 76   Temp 37.1 °C (98.7 °F) (Temporal)   Resp 20   Ht 1.803 m (5' 11\")   Wt 90.7 kg (200 lb)   SpO2 95%   BMI 27.89 kg/m²  Body mass index is 27.89 kg/m².    Gen: Alert and oriented, No apparent distress.  Neck: Neck is supple without lymphadenopathy.  Lungs: Normal effort, CTA bilaterally, no wheezes, rhonchi, or rales  CV: Regular rate and rhythm. No murmurs, rubs, or gallops.  Ext: No clubbing, cyanosis, edema.  Neurological:  negative findings: speech normal, mental status intact, muscle tone normal, muscle strength normal Alert and oriented x 3. DTRs 2+ and symmetric. No cranial nerve deficit.     Assessment & Plan:     46 y.o. male with the following -     Problem List Items Addressed This Visit     Chronic use of opiate drug for therapeutic purpose     -Continue follow-up with Dr. Olivares.         Hypertension     -Continue losartan 100 mg p.o. daily.         Nicotine addiction     -Congratulated patient on smoking sensation and encouraged him to continue his excellent work.         Other fatigue     -Plan at this time is to complete " labs for update as he states there were some changes on his labs in the ER  -Plan to continue work on healthy diet and exercise  -Discussed continue to work on healing from severe illness at the beginning of October  -Patient will follow up in 1 month or sooner as needed based on lab results.         Post viral syndrome     -Counseled patient regarding post viral syndrome  -Discussed continued rest, hydration, healthy foods, gentle exercise  -Considering fatigue is severe please see note on fatigue.           Other Visit Diagnoses     Fatigue, unspecified type        Relevant Orders    CBC WITH DIFFERENTIAL    Comp Metabolic Panel    Lipid Profile    TSH WITH REFLEX TO FT4    VITAMIN B12    FOLATE    Essential hypertension        Relevant Orders    CBC WITH DIFFERENTIAL    Comp Metabolic Panel    Mixed hyperlipidemia        Relevant Orders    Lipid Profile          Return in about 1 month (around 11/26/2021), or if symptoms worsen or fail to improve.    Please note that this dictation was created using voice recognition software. I have made every reasonable attempt to correct obvious errors, but I expect that there are errors of grammar and possibly content that I did not discover before finalizing the note.

## 2021-10-28 NOTE — ASSESSMENT & PLAN NOTE
-Plan at this time is to complete labs for update as he states there were some changes on his labs in the ER  -Plan to continue work on healthy diet and exercise  -Discussed continue to work on healing from severe illness at the beginning of October  -Patient will follow up in 1 month or sooner as needed based on lab results.   (4) no limitation

## 2021-10-28 NOTE — ASSESSMENT & PLAN NOTE
-Counseled patient regarding post viral syndrome  -Discussed continued rest, hydration, healthy foods, gentle exercise  -Considering fatigue is severe please see note on fatigue.

## 2022-01-17 RX ORDER — LOSARTAN POTASSIUM 100 MG/1
TABLET ORAL
Qty: 60 TABLET | Refills: 0 | Status: SHIPPED | OUTPATIENT
Start: 2022-01-17

## 2022-01-22 DIAGNOSIS — Z91.09 ENVIRONMENTAL ALLERGIES: ICD-10-CM

## 2022-01-24 RX ORDER — ALBUTEROL SULFATE 90 UG/1
AEROSOL, METERED RESPIRATORY (INHALATION)
Qty: 9 G | Refills: 0 | Status: SHIPPED | OUTPATIENT
Start: 2022-01-24